# Patient Record
Sex: MALE | Race: WHITE | Employment: OTHER | URBAN - METROPOLITAN AREA
[De-identification: names, ages, dates, MRNs, and addresses within clinical notes are randomized per-mention and may not be internally consistent; named-entity substitution may affect disease eponyms.]

---

## 2017-01-09 RX ORDER — INSULIN GLARGINE 100 [IU]/ML
INJECTION, SOLUTION SUBCUTANEOUS
Qty: 15 ML | Refills: 0 | Status: SHIPPED | OUTPATIENT
Start: 2017-01-09 | End: 2017-02-05

## 2017-02-06 RX ORDER — INSULIN GLARGINE 100 [IU]/ML
INJECTION, SOLUTION SUBCUTANEOUS
Qty: 15 ML | Refills: 0 | Status: SHIPPED | OUTPATIENT
Start: 2017-02-06 | End: 2017-03-06

## 2017-03-06 DIAGNOSIS — N40.0 BENIGN PROSTATIC HYPERPLASIA, PRESENCE OF LOWER URINARY TRACT SYMPTOMS UNSPECIFIED, UNSPECIFIED MORPHOLOGY: Primary | ICD-10-CM

## 2017-03-06 DIAGNOSIS — E11.36 TYPE 2 DIABETES MELLITUS WITH DIABETIC CATARACT, UNSPECIFIED LONG TERM INSULIN USE STATUS: ICD-10-CM

## 2017-03-07 RX ORDER — INSULIN GLARGINE 100 [IU]/ML
INJECTION, SOLUTION SUBCUTANEOUS
Qty: 15 ML | Refills: 3 | Status: SHIPPED | OUTPATIENT
Start: 2017-03-07 | End: 2018-01-23

## 2017-03-07 RX ORDER — FINASTERIDE 5 MG/1
TABLET, FILM COATED ORAL
Qty: 30 TABLET | Refills: 6 | Status: SHIPPED | OUTPATIENT
Start: 2017-03-07 | End: 2017-12-21

## 2017-03-10 RX ORDER — FENOFIBRATE 145 MG/1
TABLET, COATED ORAL
Qty: 30 TABLET | Refills: 3 | Status: SHIPPED | OUTPATIENT
Start: 2017-03-10 | End: 2017-07-06

## 2017-03-21 RX ORDER — INSULIN LISPRO 100 [IU]/ML
INJECTION, SOLUTION INTRAVENOUS; SUBCUTANEOUS
Qty: 15 ML | Refills: 0 | Status: SHIPPED | OUTPATIENT
Start: 2017-03-21

## 2017-03-24 ENCOUNTER — APPOINTMENT (OUTPATIENT)
Dept: LAB | Age: 67
End: 2017-03-24
Attending: UROLOGY
Payer: MEDICARE

## 2017-03-24 DIAGNOSIS — Z12.5 PROSTATE CANCER SCREENING: ICD-10-CM

## 2017-03-24 LAB — PSA SERPL-MCNC: 1.7 NG/ML (ref 0–4)

## 2017-03-24 PROCEDURE — 36415 COLL VENOUS BLD VENIPUNCTURE: CPT

## 2017-05-19 RX ORDER — ALFUZOSIN HYDROCHLORIDE 10 MG/1
TABLET, EXTENDED RELEASE ORAL
Qty: 90 TABLET | Refills: 3 | Status: SHIPPED | OUTPATIENT
Start: 2017-05-19 | End: 2018-01-23

## 2017-05-19 NOTE — TELEPHONE ENCOUNTER
Dr. Aleksandra Bridges, pt [address] [place] [address] 12/2/16 and pt pharmacy requesting refill on alfuzosin if you agree please review and sign med, thank you! Copied and pasted part of your note below. ..      Expand All Collapse All    1.   Continue Alfuzosin 10 mg daily

## 2017-06-16 ENCOUNTER — APPOINTMENT (OUTPATIENT)
Dept: LAB | Age: 67
End: 2017-06-16
Attending: UROLOGY
Payer: MEDICARE

## 2017-06-16 DIAGNOSIS — N20.0 KIDNEY STONE: ICD-10-CM

## 2017-06-16 PROCEDURE — 84105 ASSAY OF URINE PHOSPHORUS: CPT

## 2017-06-16 PROCEDURE — 83945 ASSAY OF OXALATE: CPT

## 2017-06-16 PROCEDURE — 84560 ASSAY OF URINE/URIC ACID: CPT

## 2017-06-16 PROCEDURE — 81003 URINALYSIS AUTO W/O SCOPE: CPT

## 2017-06-16 PROCEDURE — 84300 ASSAY OF URINE SODIUM: CPT

## 2017-06-16 PROCEDURE — 82340 ASSAY OF CALCIUM IN URINE: CPT

## 2017-06-16 PROCEDURE — 82507 ASSAY OF CITRATE: CPT

## 2017-07-05 ENCOUNTER — OFFICE VISIT (OUTPATIENT)
Dept: SURGERY | Facility: CLINIC | Age: 67
End: 2017-07-05

## 2017-07-05 VITALS
SYSTOLIC BLOOD PRESSURE: 144 MMHG | WEIGHT: 212 LBS | BODY MASS INDEX: 29.68 KG/M2 | HEIGHT: 71 IN | DIASTOLIC BLOOD PRESSURE: 72 MMHG | TEMPERATURE: 98 F

## 2017-07-05 DIAGNOSIS — N20.0 KIDNEY STONE: Primary | ICD-10-CM

## 2017-07-05 DIAGNOSIS — N21.0 BLADDER STONE: ICD-10-CM

## 2017-07-05 DIAGNOSIS — R82.993 HYPERURICOSURIA: ICD-10-CM

## 2017-07-05 DIAGNOSIS — Z91.89 AT RISK FOR SIDE EFFECT OF MEDICATION: ICD-10-CM

## 2017-07-05 DIAGNOSIS — R35.1 NOCTURIA: ICD-10-CM

## 2017-07-05 DIAGNOSIS — N52.01 ERECTILE DYSFUNCTION DUE TO ARTERIAL INSUFFICIENCY: ICD-10-CM

## 2017-07-05 DIAGNOSIS — R82.994 HYPERCALCIURIA: ICD-10-CM

## 2017-07-05 DIAGNOSIS — N40.1 BENIGN NON-NODULAR PROSTATIC HYPERPLASIA WITH LOWER URINARY TRACT SYMPTOMS: ICD-10-CM

## 2017-07-05 PROCEDURE — 99215 OFFICE O/P EST HI 40 MIN: CPT | Performed by: UROLOGY

## 2017-07-05 PROCEDURE — G0463 HOSPITAL OUTPT CLINIC VISIT: HCPCS | Performed by: UROLOGY

## 2017-07-05 RX ORDER — CHLORTHALIDONE 25 MG/1
25 TABLET ORAL DAILY
Qty: 30 TABLET | Refills: 11 | Status: SHIPPED | OUTPATIENT
Start: 2017-07-05 | End: 2018-06-04

## 2017-07-05 RX ORDER — POTASSIUM CITRATE 10 MEQ/1
20 TABLET, EXTENDED RELEASE ORAL 2 TIMES DAILY
Qty: 120 TABLET | Refills: 11 | Status: SHIPPED | OUTPATIENT
Start: 2017-07-05

## 2017-07-05 NOTE — PROGRESS NOTES
HPI:    Patient ID: Vince Bruce is a 78year old male.     HPI       Voiding Dysfunction  Patient has current AUA score of 8, moderate voiding dysfunction category, worsened compared to previous score of 5, mild voiding dysfunction category, on 12/2/16 p 2004. cleared of all bladder stones, but then they returned on plain x-ray. cystoscopy with laser lithotripsy of bladder stones and fulguration of BPH bleeding at [address] [place] [address] on September 16, 2008; this was for recurrent bladder stones.  trilobar obstructing BPH wi shortness of breath. Cardiovascular: Negative for chest pain. Gastrointestinal: Negative for blood in stool, constipation and diarrhea. Genitourinary: Negative for dysuria, flank pain and hematuria (gross).         Positive sensation of not emptying DAILY Disp: 90 tablet Rfl: 3   HUMALOG KWIKPEN 100 UNIT/ML Subcutaneous Solution Pen-injector INJECT 10 UNITS SUBCUTANEOUSLY THREE TIMES DAILY Disp: 15 mL Rfl: 0   FINASTERIDE 5 MG Oral Tab TAKE 1 TABLET BY MOUTH EVERY MORNING Disp: 30 tablet Rfl: 6   ALEXEY BP: 144/72   Temp: 97.7 °F (36.5 °C)   TempSrc: Oral   Weight: 212 lb (96.2 kg)   Height: 5' 11\" (1.803 m)         Body mass index is 29.57 kg/m².     LABS:  6/16/17 Urine Citric acid 1012 = high normal range; significant hypercalciuria urine calcium 461 understanding and decides to start medication.     (N21.0) Bladder stone  Plan: He is known to have persistent bladder stones and up until now, has wanted observation 11/21/16 US bladder: Prevoid volume: 1088 ml;  ml prostatomegaly;several mobile  i Prescribed chlorthalidone 25 mg and potassium citrate 1080 mg tablet,    2 tablets twice daily with food for treatment and advised f/u in 6 months.   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

## 2017-07-05 NOTE — PATIENT INSTRUCTIONS
1.  Start chlorthalidone 25 mg daily to improve your hypercalciuria (elevated levels of calcium in your urine)    2.   Start potassium citrate 1080 mg tablet,    2 tablets twice daily with food--to prevent loss of potassium from chlorthalidone; also to furt

## 2017-07-06 RX ORDER — LISINOPRIL 10 MG/1
TABLET ORAL
COMMUNITY
Start: 2017-05-28 | End: 2018-01-08

## 2017-07-06 RX ORDER — BLOOD-GLUCOSE METER
KIT MISCELLANEOUS
Refills: 4 | COMMUNITY
Start: 2017-05-01

## 2017-07-06 RX ORDER — LANCETS 28 GAUGE
EACH MISCELLANEOUS
Refills: 4 | COMMUNITY
Start: 2017-05-02

## 2017-07-06 RX ORDER — PEN NEEDLE, DIABETIC 32GX 5/32"
NEEDLE, DISPOSABLE MISCELLANEOUS
Refills: 4 | COMMUNITY
Start: 2017-05-01

## 2017-07-06 RX ORDER — INSULIN LISPRO 100 [IU]/ML
INJECTION, SUSPENSION SUBCUTANEOUS
COMMUNITY
Start: 2017-07-03 | End: 2018-01-23

## 2017-07-06 NOTE — TELEPHONE ENCOUNTER
Patient needs a RX for cholesterol, he doesn't have an old bottle and doesn't know what dose of generic he should be on. Please call the pharmacy for him and a 90 day supply.

## 2017-07-07 RX ORDER — FENOFIBRATE 145 MG/1
145 TABLET, COATED ORAL
Qty: 30 TABLET | Refills: 3 | Status: SHIPPED | OUTPATIENT
Start: 2017-07-07 | End: 2017-10-22

## 2017-07-07 RX ORDER — ATORVASTATIN CALCIUM 80 MG/1
80 TABLET, FILM COATED ORAL NIGHTLY
Qty: 90 TABLET | Refills: 1 | Status: SHIPPED | OUTPATIENT
Start: 2017-07-07

## 2017-07-18 ENCOUNTER — MED REC SCAN ONLY (OUTPATIENT)
Dept: INTERNAL MEDICINE CLINIC | Facility: CLINIC | Age: 67
End: 2017-07-18

## 2017-10-23 RX ORDER — FENOFIBRATE 145 MG/1
TABLET, COATED ORAL
Qty: 30 TABLET | Refills: 0 | Status: SHIPPED | OUTPATIENT
Start: 2017-10-23 | End: 2017-11-18

## 2017-11-20 RX ORDER — FENOFIBRATE 145 MG/1
TABLET, COATED ORAL
Qty: 30 TABLET | Refills: 0 | Status: SHIPPED | OUTPATIENT
Start: 2017-11-20

## 2017-12-21 DIAGNOSIS — N40.0 BENIGN PROSTATIC HYPERPLASIA: ICD-10-CM

## 2017-12-21 RX ORDER — FINASTERIDE 5 MG/1
TABLET, FILM COATED ORAL
Qty: 30 TABLET | Refills: 0 | Status: SHIPPED | OUTPATIENT
Start: 2017-12-21 | End: 2018-01-16

## 2018-01-02 ENCOUNTER — HOSPITAL ENCOUNTER (OUTPATIENT)
Dept: GENERAL RADIOLOGY | Age: 68
Discharge: HOME | End: 2018-01-02
Attending: UROLOGY
Payer: MEDICARE

## 2018-01-02 ENCOUNTER — APPOINTMENT (OUTPATIENT)
Dept: LAB | Age: 68
End: 2018-01-02
Attending: UROLOGY
Payer: MEDICARE

## 2018-01-02 DIAGNOSIS — Z91.89 AT RISK FOR SIDE EFFECT OF MEDICATION: ICD-10-CM

## 2018-01-02 DIAGNOSIS — N20.0 KIDNEY STONE: ICD-10-CM

## 2018-01-02 DIAGNOSIS — N21.0 BLADDER STONE: ICD-10-CM

## 2018-01-02 LAB
ANION GAP SERPL CALC-SCNC: 10 MMOL/L (ref 0–18)
BUN SERPL-MCNC: 33 MG/DL (ref 8–20)
BUN/CREAT SERPL: 18.3 (ref 10–20)
CALCIUM SERPL-MCNC: 9.5 MG/DL (ref 8.5–10.5)
CHLORIDE SERPL-SCNC: 100 MMOL/L (ref 95–110)
CO2 SERPL-SCNC: 26 MMOL/L (ref 22–32)
CREAT SERPL-MCNC: 1.8 MG/DL (ref 0.5–1.5)
GLUCOSE SERPL-MCNC: 316 MG/DL (ref 70–99)
OSMOLALITY UR CALC.SUM OF ELEC: 301 MOSM/KG (ref 275–295)
POTASSIUM SERPL-SCNC: 4.1 MMOL/L (ref 3.3–5.1)
SODIUM SERPL-SCNC: 136 MMOL/L (ref 136–144)

## 2018-01-02 PROCEDURE — 80048 BASIC METABOLIC PNL TOTAL CA: CPT

## 2018-01-02 PROCEDURE — 36415 COLL VENOUS BLD VENIPUNCTURE: CPT

## 2018-01-02 PROCEDURE — 74019 RADEX ABDOMEN 2 VIEWS: CPT | Performed by: UROLOGY

## 2018-01-03 DIAGNOSIS — N19 RENAL FAILURE, UNSPECIFIED CHRONICITY: Primary | ICD-10-CM

## 2018-01-04 ENCOUNTER — TELEPHONE (OUTPATIENT)
Dept: SURGERY | Facility: CLINIC | Age: 68
End: 2018-01-04

## 2018-01-04 ENCOUNTER — APPOINTMENT (OUTPATIENT)
Dept: LAB | Age: 68
End: 2018-01-04
Attending: UROLOGY
Payer: MEDICARE

## 2018-01-04 DIAGNOSIS — N20.0 KIDNEY STONE: ICD-10-CM

## 2018-01-04 LAB
CALCIUM 24H UR-MRATE: 201 MG/24H (ref 50–150)
PH 24H UR: 6 [PH] (ref 5–8)
PHOSPHATE 24H UR-MRATE: 1.2 G/24 HR (ref 0.4–1.3)
SODIUM 24H UR-SRATE: 144 MMOL/24H (ref 40–220)
SPECIMEN VOL 24H UR: 1950 ML/24H
URATE 24H UR-MRATE: 891 MG/24H (ref 250–750)

## 2018-01-04 PROCEDURE — 84105 ASSAY OF URINE PHOSPHORUS: CPT

## 2018-01-04 PROCEDURE — 82507 ASSAY OF CITRATE: CPT

## 2018-01-04 PROCEDURE — 81003 URINALYSIS AUTO W/O SCOPE: CPT

## 2018-01-04 PROCEDURE — 84560 ASSAY OF URINE/URIC ACID: CPT

## 2018-01-04 PROCEDURE — 84300 ASSAY OF URINE SODIUM: CPT

## 2018-01-04 PROCEDURE — 83945 ASSAY OF OXALATE: CPT

## 2018-01-04 PROCEDURE — 82340 ASSAY OF CALCIUM IN URINE: CPT

## 2018-01-04 NOTE — TELEPHONE ENCOUNTER
----- Message from Trina Russell MD sent at 1/3/2018 10:42 PM CST -----  Nurses, please call patient with the following message ---  Blood glucose abnormally high at 316 whereas normal would be up to 99. Kidney function blood test is worse compared to a year ago. KUB plain x-ray shows a large  cluster of bladder stones, probably representing 1 composite stone and ask about 1.5-2 inches in size inside the bladder. I am asking my nurses to call you to have you get a kidney ultrasound before you see me next week to make sure there is no blockage of either kidney, a concern in light of the worsening in kidney function. , please try to get a kidney ultrasound I will discuss all these matters with patient in person when I see patient in the office next week.   Juan M Hawk M.D., FACS

## 2018-01-05 ENCOUNTER — HOSPITAL ENCOUNTER (OUTPATIENT)
Dept: ULTRASOUND IMAGING | Age: 68
Discharge: HOME | End: 2018-01-05
Attending: UROLOGY
Payer: MEDICARE

## 2018-01-05 ENCOUNTER — APPOINTMENT (OUTPATIENT)
Dept: LAB | Age: 68
End: 2018-01-05
Attending: UROLOGY
Payer: MEDICARE

## 2018-01-05 DIAGNOSIS — N19 RENAL FAILURE, UNSPECIFIED CHRONICITY: ICD-10-CM

## 2018-01-05 LAB
CITRIC ACID, URINE - MG/DAY: 526 MG/D
CITRIC ACID, URINE - MG/L: 270 MG/L
CITRIC ACID/CREATININE RATIO: 325 MG/G
CREATININE, URINE - PER 24H: 1618 MG/D
CREATININE, URINE - PER VOLUME: 83 MG/DL
HOURS COLLECTED: 24 HR
TOTAL VOLUME: 1950 ML
URATE SERPL-MCNC: 5.1 MG/DL (ref 3.3–8.7)

## 2018-01-05 PROCEDURE — 84550 ASSAY OF BLOOD/URIC ACID: CPT | Performed by: UROLOGY

## 2018-01-05 PROCEDURE — 36415 COLL VENOUS BLD VENIPUNCTURE: CPT | Performed by: UROLOGY

## 2018-01-05 PROCEDURE — 76770 US EXAM ABDO BACK WALL COMP: CPT | Performed by: UROLOGY

## 2018-01-06 LAB
CREATININE, URINE - PER 24H: 1696 MG/D
CREATININE, URINE - PER VOLUME: 87 MG/DL
HOURS COLLECTED: 24 HR
OXALATE, URINE - MG/DAY: 25 MG/D
OXALATE, URINE - MG/L: 13 MG/L
TOTAL VOLUME: 1950 ML

## 2018-01-08 ENCOUNTER — TELEPHONE (OUTPATIENT)
Dept: SURGERY | Facility: CLINIC | Age: 68
End: 2018-01-08

## 2018-01-08 ENCOUNTER — OFFICE VISIT (OUTPATIENT)
Dept: SURGERY | Facility: CLINIC | Age: 68
End: 2018-01-08

## 2018-01-08 VITALS
WEIGHT: 208 LBS | DIASTOLIC BLOOD PRESSURE: 68 MMHG | TEMPERATURE: 98 F | HEART RATE: 74 BPM | BODY MASS INDEX: 29.12 KG/M2 | HEIGHT: 71 IN | RESPIRATION RATE: 16 BRPM | SYSTOLIC BLOOD PRESSURE: 122 MMHG

## 2018-01-08 DIAGNOSIS — N21.0 BLADDER STONE: Primary | ICD-10-CM

## 2018-01-08 DIAGNOSIS — R82.994 HYPERCALCIURIA: ICD-10-CM

## 2018-01-08 DIAGNOSIS — R31.29 MICROHEMATURIA: ICD-10-CM

## 2018-01-08 DIAGNOSIS — R35.1 NOCTURIA: ICD-10-CM

## 2018-01-08 DIAGNOSIS — N20.0 KIDNEY STONE: ICD-10-CM

## 2018-01-08 DIAGNOSIS — R79.89 LOW VITAMIN D LEVEL: ICD-10-CM

## 2018-01-08 DIAGNOSIS — N40.1 BENIGN PROSTATIC HYPERPLASIA WITH URINARY FREQUENCY: ICD-10-CM

## 2018-01-08 DIAGNOSIS — N52.01 ERECTILE DYSFUNCTION DUE TO ARTERIAL INSUFFICIENCY: ICD-10-CM

## 2018-01-08 DIAGNOSIS — R35.0 BENIGN PROSTATIC HYPERPLASIA WITH URINARY FREQUENCY: ICD-10-CM

## 2018-01-08 DIAGNOSIS — R82.993 HYPERURICOSURIA: ICD-10-CM

## 2018-01-08 DIAGNOSIS — N19 RENAL FAILURE, UNSPECIFIED CHRONICITY: ICD-10-CM

## 2018-01-08 PROCEDURE — 99215 OFFICE O/P EST HI 40 MIN: CPT | Performed by: UROLOGY

## 2018-01-08 PROCEDURE — G0463 HOSPITAL OUTPT CLINIC VISIT: HCPCS | Performed by: UROLOGY

## 2018-01-08 NOTE — PATIENT INSTRUCTIONS
1.  Blood draw for vitamin D 25-hydroxy in the near future. That will help us determine if you need to be on supplemental vitamin D    2. Continue alfuzosin 10 mg daily    3. Continue finasteride 5 mg daily    4.   Continue chlorthalidone 25 mg daily may be needed right now. If symptoms are more severe, treatment is likely needed. The goal of treatment is to improve urine flow and reduce symptoms. Treatments can include medicine and procedures.  Your healthcare provider will discuss treatment options wi urine  · Increasing low back pain, not related to injury  · Symptoms of urinary infection (increased urge to urinate, burning when passing urine, foul-smelling urine)  Date Last Reviewed: 7/1/2016  © 0043-2682 [employer].  [facility]

## 2018-01-08 NOTE — TELEPHONE ENCOUNTER
Patient seen in office scheduled for cysto, laser litho large bladder stone, on 02/20/18 @ 7:30 at [facility]/outpatient, went over pre-op with patient stated verbally understood.

## 2018-01-08 NOTE — PROGRESS NOTES
HPI:    Patient ID: Yifan Joaquin is a 75year old male. HPI     Bladder stone  Chronic problem. 01/02/2018 KUB = Innumerable (more than 20) bladder calculi visualized within the bladder measure up to 14 mm in diameter similar to prior abdominal Ct.  Pa Chronic problem. Patient denies any gross hematuria. He feels problem is stable. Kidney Failure  01/02/2018 Creatinine = 1.80; eGFR = 38 compared to 2/23/16 Creatinine 1.363, eGFR 52. Pt denies flank pain. CHART REVIEW: Please see above. In addition to above. .. Known large BPH for a long time. Cystoscopy with EHL of large 4 cm bladder stone [facility] August 19, 2004. cleared of all bladder stones, but then they returned on plain x-ray.  cystoscopy with laser lithot Restarted alfuzosin 10 mg daily.  07/05/2017 office visit with me; Prostate: 3+ enlarged, 40 g, no palpable nodules or indurations; Start chlorthalidone 25 mg daily; Start potassium citrate 1080 mg tablet, 2 tablets twice daily with food; Continue finasteri Cans of beer: 1 per week     Comment: social              Current Outpatient Prescriptions:  FINASTERIDE 5 MG Oral Tab TAKE 1 TABLET BY MOUTH EVERY MORNING Disp: 30 tablet Rfl: 0   FENOFIBRATE 145 MG Oral Tab TAKE 1 TABLET(145 MG) BY MOUTH EVERY DAY Dis Neck: Normal range of motion. Cardiovascular: Normal rate, regular rhythm and normal heart sounds. Exam reveals no gallop and no friction rub. No murmur heard. Pulmonary/Chest: Effort normal and breath sounds normal. No respiratory distress.  He has 11/21/16 US bladder: Prevoid volume: 1088 ml;  ml. Conclusion Prostatomegaly. Several mobile  intraluminal bladder calculi measure up to 11 mm.    Patient states that he did not urinate as well and as normally as typically, when he had his bladder ul 01/02/2018 KUB = Innumerable (more than 20) bladder calculi visualized within the bladder measure up to 14 mm in diameter similar to prior abdominal CT.  During today's examination, I reviewed 01/02/2018 KUB with the patient and explained that most likely t 36/94/9824 metabolic 35-TL urine uric acid urine = 891, elevated. I explained to the patient this is likely caused by high intake of red meat.  I explained treatment option of limiting red meat consumption and pt chooses to follow diet protocols.    (R35.1) 9.  Please no aspirin or NSAIDs (medications such as ibuprofen/Motrin, Advil, Aleve) for 10 days before procedure    10. You may take Tylenol or extra strength Tylenol or generic equivalent even right before the procedure    11.    Urine culture at [address] [place] [address]

## 2018-01-16 DIAGNOSIS — N40.0 BENIGN PROSTATIC HYPERPLASIA: ICD-10-CM

## 2018-01-17 RX ORDER — FINASTERIDE 5 MG/1
TABLET, FILM COATED ORAL
Qty: 30 TABLET | Refills: 0 | Status: SHIPPED | OUTPATIENT
Start: 2018-01-17 | End: 2018-01-23

## 2018-01-22 ENCOUNTER — TELEPHONE (OUTPATIENT)
Dept: INTERNAL MEDICINE CLINIC | Facility: CLINIC | Age: 68
End: 2018-01-22

## 2018-01-23 RX ORDER — HYDROCODONE BITARTRATE AND ACETAMINOPHEN 5; 325 MG/1; MG/1
1 TABLET ORAL EVERY 6 HOURS PRN
COMMUNITY
End: 2018-04-26

## 2018-01-24 ENCOUNTER — TELEPHONE (OUTPATIENT)
Dept: SURGERY | Facility: CLINIC | Age: 68
End: 2018-01-24

## 2018-01-24 NOTE — TELEPHONE ENCOUNTER
Please call patient back. He needs to take his alfuzosin; if need be, he can take it with a sip of water; he should not take his chlorthalidone potassium citrate on the day of his orthopedic surgery, however.   Many thanks, Dr. Roberta Rascon

## 2018-01-24 NOTE — H&P
[facility]    PATIENT'S NAME: NATION, [de-identified] D   ATTENDING PHYSICIAN: Ryann Beckett MD   PATIENT ACCOUNT#:   [id number]    LOCATION:  [facility]  MEDICAL RECORD #:   [id number]       YOB: 1950  ADMISSION DATE:       01/25/2018 fibula pain. Skin wrinkles were intact. There were no blisters. X-rays show an unstable right ankle fracture with a fibula fracture and widening of the medial clear space indicative of medial deltoid ligament rupture.   There was also a small posterior

## 2018-01-24 NOTE — TELEPHONE ENCOUNTER
Pt tyler his lag and has to have surgery tomorrow - asking about going off meds for this and if it will affect his surgery with PVK on 2/20

## 2018-01-24 NOTE — TELEPHONE ENCOUNTER
Returned pt's call and spoke with him. He is asking if he has to stop any of his medications prior to surgery on his leg tomorrow. Advised pt to call the nurse of the surgeon who is performing the leg surgery, as this is the surgery protocol , not urology.

## 2018-01-25 ENCOUNTER — ANESTHESIA EVENT (OUTPATIENT)
Dept: SURGERY | Facility: HOSPITAL | Age: 68
End: 2018-01-25
Payer: MEDICARE

## 2018-01-25 ENCOUNTER — HOSPITAL ENCOUNTER (OUTPATIENT)
Facility: HOSPITAL | Age: 68
Setting detail: HOSPITAL OUTPATIENT SURGERY
Discharge: HOME | End: 2018-01-25
Attending: ORTHOPAEDIC SURGERY | Admitting: ORTHOPAEDIC SURGERY
Payer: MEDICARE

## 2018-01-25 ENCOUNTER — APPOINTMENT (OUTPATIENT)
Dept: GENERAL RADIOLOGY | Facility: HOSPITAL | Age: 68
End: 2018-01-25
Attending: ORTHOPAEDIC SURGERY
Payer: MEDICARE

## 2018-01-25 ENCOUNTER — ANESTHESIA (OUTPATIENT)
Dept: SURGERY | Facility: HOSPITAL | Age: 68
End: 2018-01-25
Payer: MEDICARE

## 2018-01-25 ENCOUNTER — SURGERY (OUTPATIENT)
Age: 68
End: 2018-01-25

## 2018-01-25 VITALS
DIASTOLIC BLOOD PRESSURE: 70 MMHG | SYSTOLIC BLOOD PRESSURE: 138 MMHG | OXYGEN SATURATION: 98 % | TEMPERATURE: 99 F | HEART RATE: 80 BPM | BODY MASS INDEX: 28.7 KG/M2 | RESPIRATION RATE: 15 BRPM | WEIGHT: 205 LBS | HEIGHT: 71 IN

## 2018-01-25 DIAGNOSIS — S82.851A CLOSED DISPLACED TRIMALLEOLAR FRACTURE OF RIGHT ANKLE, INITIAL ENCOUNTER: Primary | ICD-10-CM

## 2018-01-25 PROBLEM — I10 ESSENTIAL HYPERTENSION: Chronic | Status: ACTIVE | Noted: 2018-01-25
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
## 2018-01-25 PROBLEM — E78.5 HYPERLIPIDEMIA: Chronic | Status: ACTIVE | Noted: 2018-01-25
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
## 2018-01-25 LAB
GLUCOSE BLDC GLUCOMTR-MCNC: 137 MG/DL (ref 70–99)
GLUCOSE BLDC GLUCOMTR-MCNC: 178 MG/DL (ref 70–99)
GLUCOSE BLDC GLUCOMTR-MCNC: 216 MG/DL (ref 70–99)

## 2018-01-25 PROCEDURE — 64445 NJX AA&/STRD SCIATIC NRV IMG: CPT | Performed by: ORTHOPAEDIC SURGERY

## 2018-01-25 PROCEDURE — 3E0T3BZ INTRODUCTION OF ANESTHETIC AGENT INTO PERIPHERAL NERVES AND PLEXI, PERCUTANEOUS APPROACH: ICD-10-PCS | Performed by: ANESTHESIOLOGY

## 2018-01-25 PROCEDURE — 73610 X-RAY EXAM OF ANKLE: CPT | Performed by: ORTHOPAEDIC SURGERY

## 2018-01-25 PROCEDURE — 82962 GLUCOSE BLOOD TEST: CPT

## 2018-01-25 PROCEDURE — 99152 MOD SED SAME PHYS/QHP 5/>YRS: CPT | Performed by: ORTHOPAEDIC SURGERY

## 2018-01-25 PROCEDURE — 76942 ECHO GUIDE FOR BIOPSY: CPT | Performed by: ORTHOPAEDIC SURGERY

## 2018-01-25 PROCEDURE — 0QSJ04Z REPOSITION RIGHT FIBULA WITH INTERNAL FIXATION DEVICE, OPEN APPROACH: ICD-10-PCS | Performed by: ORTHOPAEDIC SURGERY

## 2018-01-25 PROCEDURE — 0QSG04Z REPOSITION RIGHT TIBIA WITH INTERNAL FIXATION DEVICE, OPEN APPROACH: ICD-10-PCS | Performed by: ORTHOPAEDIC SURGERY

## 2018-01-25 PROCEDURE — 76001 XR C-ARM FLUORO >1 HOUR  (CPT=76001): CPT | Performed by: ORTHOPAEDIC SURGERY

## 2018-01-25 DEVICE — SCREW BN 2.7MM 20MM LCP SS: Type: IMPLANTABLE DEVICE | Site: ANKLE | Status: FUNCTIONAL

## 2018-01-25 DEVICE — IMPLANTABLE DEVICE: Type: IMPLANTABLE DEVICE | Site: ANKLE | Status: FUNCTIONAL

## 2018-01-25 DEVICE — SCREW BN 2.7MM 2.1MM 16MM LCP: Type: IMPLANTABLE DEVICE | Site: ANKLE | Status: FUNCTIONAL

## 2018-01-25 DEVICE — SCREW BN 2.7MM 18MM LCP SS: Type: IMPLANTABLE DEVICE | Site: ANKLE | Status: FUNCTIONAL

## 2018-01-25 RX ORDER — IBUPROFEN 600 MG/1
600 TABLET ORAL EVERY 6 HOURS PRN
Status: CANCELLED | OUTPATIENT
Start: 2018-01-25

## 2018-01-25 RX ORDER — METOCLOPRAMIDE 10 MG/1
10 TABLET ORAL ONCE
Status: DISCONTINUED | OUTPATIENT
Start: 2018-01-25 | End: 2018-01-25 | Stop reason: HOSPADM

## 2018-01-25 RX ORDER — HYDROCODONE BITARTRATE AND ACETAMINOPHEN 5; 325 MG/1; MG/1
1 TABLET ORAL AS NEEDED
Status: DISCONTINUED | OUTPATIENT
Start: 2018-01-25 | End: 2018-01-25

## 2018-01-25 RX ORDER — MULTIVIT-MIN/FA/LYCOPEN/LUTEIN .4-300-25
1 TABLET ORAL DAILY
Status: CANCELLED | OUTPATIENT
Start: 2018-01-25

## 2018-01-25 RX ORDER — BLOOD-GLUCOSE METER
1 KIT MISCELLANEOUS 3 TIMES DAILY
Status: CANCELLED | OUTPATIENT
Start: 2018-01-25

## 2018-01-25 RX ORDER — FINASTERIDE 5 MG/1
5 TABLET, FILM COATED ORAL DAILY
Status: CANCELLED | OUTPATIENT
Start: 2018-01-25

## 2018-01-25 RX ORDER — CYANOCOBALAMIN (VITAMIN B-12) 1000 MCG
1 TABLET, EXTENDED RELEASE ORAL 2 TIMES DAILY WITH MEALS
Status: CANCELLED | OUTPATIENT
Start: 2018-01-25

## 2018-01-25 RX ORDER — DEXTROSE MONOHYDRATE 25 G/50ML
50 INJECTION, SOLUTION INTRAVENOUS AS NEEDED
Status: CANCELLED | OUTPATIENT
Start: 2018-01-25

## 2018-01-25 RX ORDER — HYDROMORPHONE HYDROCHLORIDE 1 MG/ML
0.2 INJECTION, SOLUTION INTRAMUSCULAR; INTRAVENOUS; SUBCUTANEOUS EVERY 5 MIN PRN
Status: DISCONTINUED | OUTPATIENT
Start: 2018-01-25 | End: 2018-01-25

## 2018-01-25 RX ORDER — HALOPERIDOL 5 MG/ML
0.25 INJECTION INTRAMUSCULAR ONCE AS NEEDED
Status: DISCONTINUED | OUTPATIENT
Start: 2018-01-25 | End: 2018-01-25

## 2018-01-25 RX ORDER — HYDROCODONE BITARTRATE AND ACETAMINOPHEN 5; 325 MG/1; MG/1
2 TABLET ORAL AS NEEDED
Status: DISCONTINUED | OUTPATIENT
Start: 2018-01-25 | End: 2018-01-25

## 2018-01-25 RX ORDER — CEFAZOLIN SODIUM/WATER 2 G/20 ML
2 SYRINGE (ML) INTRAVENOUS ONCE
Status: DISCONTINUED | OUTPATIENT
Start: 2018-01-25 | End: 2018-01-25 | Stop reason: HOSPADM

## 2018-01-25 RX ORDER — CHLORTHALIDONE 25 MG/1
25 TABLET ORAL DAILY
Status: CANCELLED | OUTPATIENT
Start: 2018-01-25

## 2018-01-25 RX ORDER — HYDROMORPHONE HYDROCHLORIDE 1 MG/ML
0.4 INJECTION, SOLUTION INTRAMUSCULAR; INTRAVENOUS; SUBCUTANEOUS EVERY 5 MIN PRN
Status: DISCONTINUED | OUTPATIENT
Start: 2018-01-25 | End: 2018-01-25

## 2018-01-25 RX ORDER — ACETAMINOPHEN 500 MG
1000 TABLET ORAL ONCE
Status: DISCONTINUED | OUTPATIENT
Start: 2018-01-25 | End: 2018-01-25 | Stop reason: HOSPADM

## 2018-01-25 RX ORDER — MORPHINE SULFATE 10 MG/ML
6 INJECTION, SOLUTION INTRAMUSCULAR; INTRAVENOUS EVERY 10 MIN PRN
Status: DISCONTINUED | OUTPATIENT
Start: 2018-01-25 | End: 2018-01-25

## 2018-01-25 RX ORDER — IBUPROFEN 600 MG/1
600 TABLET ORAL EVERY 6 HOURS PRN
Status: DISCONTINUED | OUTPATIENT
Start: 2018-01-25 | End: 2018-01-25

## 2018-01-25 RX ORDER — POTASSIUM CITRATE 10 MEQ/1
20 TABLET, EXTENDED RELEASE ORAL 2 TIMES DAILY
Status: CANCELLED | OUTPATIENT
Start: 2018-01-25

## 2018-01-25 RX ORDER — MORPHINE SULFATE 4 MG/ML
4 INJECTION, SOLUTION INTRAMUSCULAR; INTRAVENOUS EVERY 10 MIN PRN
Status: DISCONTINUED | OUTPATIENT
Start: 2018-01-25 | End: 2018-01-25

## 2018-01-25 RX ORDER — HYDROMORPHONE HYDROCHLORIDE 1 MG/ML
0.6 INJECTION, SOLUTION INTRAMUSCULAR; INTRAVENOUS; SUBCUTANEOUS EVERY 5 MIN PRN
Status: DISCONTINUED | OUTPATIENT
Start: 2018-01-25 | End: 2018-01-25

## 2018-01-25 RX ORDER — FAMOTIDINE 20 MG/1
20 TABLET ORAL ONCE
Status: DISCONTINUED | OUTPATIENT
Start: 2018-01-25 | End: 2018-01-25 | Stop reason: HOSPADM

## 2018-01-25 RX ORDER — ROPIVACAINE HYDROCHLORIDE 5 MG/ML
INJECTION, SOLUTION EPIDURAL; INFILTRATION; PERINEURAL AS NEEDED
Status: DISCONTINUED | OUTPATIENT
Start: 2018-01-25 | End: 2018-01-25 | Stop reason: SURG

## 2018-01-25 RX ORDER — ONDANSETRON 2 MG/ML
4 INJECTION INTRAMUSCULAR; INTRAVENOUS EVERY 6 HOURS PRN
Status: DISCONTINUED | OUTPATIENT
Start: 2018-01-25 | End: 2018-01-25

## 2018-01-25 RX ORDER — MULTIVIT-MIN/FA/LYCOPEN/LUTEIN .4-300-25
1 TABLET ORAL DAILY
Qty: 30 TABLET | Refills: 0 | Status: SHIPPED | OUTPATIENT
Start: 2018-01-25

## 2018-01-25 RX ORDER — SODIUM CHLORIDE 9 MG/ML
INJECTION, SOLUTION INTRAVENOUS CONTINUOUS PRN
Status: DISCONTINUED | OUTPATIENT
Start: 2018-01-25 | End: 2018-01-25 | Stop reason: SURG

## 2018-01-25 RX ORDER — SODIUM CHLORIDE, SODIUM LACTATE, POTASSIUM CHLORIDE, CALCIUM CHLORIDE 600; 310; 30; 20 MG/100ML; MG/100ML; MG/100ML; MG/100ML
INJECTION, SOLUTION INTRAVENOUS CONTINUOUS
Status: DISCONTINUED | OUTPATIENT
Start: 2018-01-25 | End: 2018-01-25

## 2018-01-25 RX ORDER — ONDANSETRON 2 MG/ML
INJECTION INTRAMUSCULAR; INTRAVENOUS AS NEEDED
Status: DISCONTINUED | OUTPATIENT
Start: 2018-01-25 | End: 2018-01-25

## 2018-01-25 RX ORDER — CYANOCOBALAMIN (VITAMIN B-12) 1000 MCG
1 TABLET, EXTENDED RELEASE ORAL 2 TIMES DAILY WITH MEALS
Qty: 60 TABLET | Refills: 0 | Status: ON HOLD | OUTPATIENT
Start: 2018-01-25 | End: 2018-05-15 | Stop reason: CLARIF

## 2018-01-25 RX ORDER — MIDAZOLAM HYDROCHLORIDE 1 MG/ML
INJECTION INTRAMUSCULAR; INTRAVENOUS AS NEEDED
Status: DISCONTINUED | OUTPATIENT
Start: 2018-01-25 | End: 2018-01-25 | Stop reason: SURG

## 2018-01-25 RX ORDER — ROCURONIUM BROMIDE 10 MG/ML
INJECTION, SOLUTION INTRAVENOUS AS NEEDED
Status: DISCONTINUED | OUTPATIENT
Start: 2018-01-25 | End: 2018-01-25 | Stop reason: SURG

## 2018-01-25 RX ORDER — MORPHINE SULFATE 2 MG/ML
2 INJECTION, SOLUTION INTRAMUSCULAR; INTRAVENOUS EVERY 10 MIN PRN
Status: DISCONTINUED | OUTPATIENT
Start: 2018-01-25 | End: 2018-01-25

## 2018-01-25 RX ORDER — ONDANSETRON 2 MG/ML
4 INJECTION INTRAMUSCULAR; INTRAVENOUS ONCE AS NEEDED
Status: DISCONTINUED | OUTPATIENT
Start: 2018-01-25 | End: 2018-01-25

## 2018-01-25 RX ORDER — FENOFIBRATE 134 MG/1
134 CAPSULE ORAL
Status: CANCELLED | OUTPATIENT
Start: 2018-01-26

## 2018-01-25 RX ORDER — DOXEPIN HYDROCHLORIDE 50 MG/1
1 CAPSULE ORAL DAILY
Status: DISCONTINUED | OUTPATIENT
Start: 2018-01-25 | End: 2018-01-25

## 2018-01-25 RX ORDER — CYANOCOBALAMIN (VITAMIN B-12) 1000 MCG
1 TABLET, EXTENDED RELEASE ORAL 2 TIMES DAILY WITH MEALS
Status: DISCONTINUED | OUTPATIENT
Start: 2018-01-25 | End: 2018-01-25

## 2018-01-25 RX ORDER — DEXTROSE MONOHYDRATE 25 G/50ML
50 INJECTION, SOLUTION INTRAVENOUS
Status: DISCONTINUED | OUTPATIENT
Start: 2018-01-25 | End: 2018-01-25

## 2018-01-25 RX ORDER — LIDOCAINE HYDROCHLORIDE 10 MG/ML
INJECTION, SOLUTION EPIDURAL; INFILTRATION; INTRACAUDAL; PERINEURAL AS NEEDED
Status: DISCONTINUED | OUTPATIENT
Start: 2018-01-25 | End: 2018-01-25 | Stop reason: SURG

## 2018-01-25 RX ORDER — LIDOCAINE HYDROCHLORIDE 40 MG/ML
SOLUTION TOPICAL AS NEEDED
Status: DISCONTINUED | OUTPATIENT
Start: 2018-01-25 | End: 2018-01-25

## 2018-01-25 RX ORDER — ALFUZOSIN HYDROCHLORIDE 10 MG/1
10 TABLET, EXTENDED RELEASE ORAL DAILY
Status: CANCELLED | OUTPATIENT
Start: 2018-01-25

## 2018-01-25 RX ORDER — NALOXONE HYDROCHLORIDE 0.4 MG/ML
80 INJECTION, SOLUTION INTRAMUSCULAR; INTRAVENOUS; SUBCUTANEOUS AS NEEDED
Status: DISCONTINUED | OUTPATIENT
Start: 2018-01-25 | End: 2018-01-25

## 2018-01-25 RX ORDER — CEFAZOLIN SODIUM/WATER 2 G/20 ML
SYRINGE (ML) INTRAVENOUS AS NEEDED
Status: DISCONTINUED | OUTPATIENT
Start: 2018-01-25 | End: 2018-01-25

## 2018-01-25 RX ORDER — ATORVASTATIN CALCIUM 40 MG/1
80 TABLET, FILM COATED ORAL NIGHTLY
Status: CANCELLED | OUTPATIENT
Start: 2018-01-25

## 2018-01-25 RX ORDER — HYDROCODONE BITARTRATE AND ACETAMINOPHEN 5; 325 MG/1; MG/1
1 TABLET ORAL EVERY 6 HOURS PRN
Status: CANCELLED | OUTPATIENT
Start: 2018-01-25

## 2018-01-25 RX ORDER — IBUPROFEN 600 MG/1
600 TABLET ORAL EVERY 6 HOURS PRN
Qty: 40 TABLET | Refills: 0 | Status: SHIPPED | OUTPATIENT
Start: 2018-01-25 | End: 2018-05-08

## 2018-01-25 RX ADMIN — SODIUM CHLORIDE: 9 INJECTION, SOLUTION INTRAVENOUS at 15:17:00

## 2018-01-25 RX ADMIN — LIDOCAINE HYDROCHLORIDE 4 ML: 40 SOLUTION TOPICAL at 13:51:00

## 2018-01-25 RX ADMIN — LIDOCAINE HYDROCHLORIDE 5 ML: 10 INJECTION, SOLUTION EPIDURAL; INFILTRATION; INTRACAUDAL; PERINEURAL at 13:51:00

## 2018-01-25 RX ADMIN — ROPIVACAINE HYDROCHLORIDE 30 ML: 5 INJECTION, SOLUTION EPIDURAL; INFILTRATION; PERINEURAL at 13:02:00

## 2018-01-25 RX ADMIN — ROCURONIUM BROMIDE 10 MG: 10 INJECTION, SOLUTION INTRAVENOUS at 13:51:00

## 2018-01-25 RX ADMIN — SODIUM CHLORIDE: 9 INJECTION, SOLUTION INTRAVENOUS at 13:46:00

## 2018-01-25 RX ADMIN — CEFAZOLIN SODIUM/WATER 2 G: 2 G/20 ML SYRINGE (ML) INTRAVENOUS at 14:11:00

## 2018-01-25 RX ADMIN — MIDAZOLAM HYDROCHLORIDE 2 MG: 1 INJECTION INTRAMUSCULAR; INTRAVENOUS at 12:57:00

## 2018-01-25 RX ADMIN — ONDANSETRON 4 MG: 2 INJECTION INTRAMUSCULAR; INTRAVENOUS at 13:51:00

## 2018-01-25 RX ADMIN — LIDOCAINE HYDROCHLORIDE 2 ML: 10 INJECTION, SOLUTION EPIDURAL; INFILTRATION; INTRACAUDAL; PERINEURAL at 12:58:00

## 2018-01-25 NOTE — INTERVAL H&P NOTE
Pre-op Diagnosis: right ankle fracture, fall at home    The above referenced H&P was reviewed by Donaldo Ramírez MD on 1/25/2018, the patient was examined and no significant changes have occurred in the patient's condition since the H&P was performed.   I

## 2018-01-25 NOTE — ANESTHESIA POSTPROCEDURE EVALUATION
Patient: Lan Wild    Procedure Summary     Date:  01/25/18 Room / Location:  [address] [place] [address] MAIN OR 06 / [address] [place] [address] MAIN OR    Anesthesia Start:  5866 Anesthesia Stop:  1800    Procedure:  ANKLE OPEN REDUCTION INTERNAL FIXATION (Right Ankle) Diagnosis:  (right ankle f

## 2018-01-25 NOTE — OPERATIVE REPORT
[facility]    PATIENT'S NAME: NATION, [de-identified] D   ATTENDING PHYSICIAN: Mi Barahona MD   OPERATING PHYSICIAN: Teri Barahona MD   PATIENT ACCOUNT#:   [id number]    LOCATION:  [facility] [address] PACU [facility] 10  MEDICAL RECORD #:   [id number]       DATE draped in sterile fashion with alcohol followed by ChloraPrep. The leg was exsanguinated, and the tourniquet inflated to 300 mmHg. Tourniquet time for the case was 34 minutes. Incision was made over the distal fibula, and the fracture was identified. him that he needed to take the Citracal Plus D twice a day as well as multivitamin once a day. Ibuprofen was also prescribed. He will follow up with Dr. Tres Aldridge in 1 week's time in the office. Dictated By Shant Carmen.  Tres Aldridge MD  d: 01/25/2018 15:08:25

## 2018-01-25 NOTE — ANESTHESIA PROCEDURE NOTES
Peripheral Block    Anesthesiologist:  Amarilis Cavazos  Performed by:   Anesthesiologist  Patient Location:  PACU  Start Time:  1/25/2018 12:57 PM  End Time:  1/25/2018 1:04 PM  Site Identification: ultrasound guided, real time ultrasound guided, nerve stim

## 2018-01-25 NOTE — BRIEF OP NOTE
Pre-Operative Diagnosis: right ankle fracture, fall at home     Post-Operative Diagnosis: Right ankle trimalleolar ankle fracture, fall at home     Procedure Performed: open reduction internal fixation right ankle w/o posterior lip, fluoroscopy    Surge

## 2018-01-25 NOTE — ANESTHESIA PREPROCEDURE EVALUATION
Anesthesia PreOp Note    HPI:     Jada Segundo is a 75year old male who presents for preoperative consultation requested by: Daisy Valdez MD    Date of Surgery: 1/25/2018    Procedure(s):  ANKLE OPEN REDUCTION INTERNAL FIXATION  Indication: right a Turmeric 450 MG Oral Cap Take 900 mg by mouth daily.  Disp:  Rfl:  Past Week at Unknown time   FENOFIBRATE 145 MG Oral Tab TAKE 1 TABLET(145 MG) BY MOUTH EVERY DAY Disp: 30 tablet Rfl: 0 1/24/2018 at 0700   atorvastatin 80 MG Oral Tab Take 1 tablet (80 mg t Metoclopramide HCl (REGLAN) tab 10 mg 10 mg Oral Once Johny Bailon MD   ceFAZolin sodium (ANCEF/KEFZOL) 2 GM/20ML premix IV syringe 2 g 2 g Intravenous Once Johny Bailon MD     No current [facility]-ordered outpatient prescriptions on file.     No Known Anesthesia ROS/Med Hx and Physical Exam     Patient summary reviewed and Nursing notes reviewed    Airway   Mallampati: II  TM distance: >3 FB  Neck ROM: full  Dental          Pulmonary - negative ROS and normal exam    breath sounds clear to auscultation

## 2018-02-05 ENCOUNTER — TELEPHONE (OUTPATIENT)
Dept: SURGERY | Facility: CLINIC | Age: 68
End: 2018-02-05

## 2018-02-07 ENCOUNTER — TELEPHONE (OUTPATIENT)
Dept: SURGERY | Facility: CLINIC | Age: 68
End: 2018-02-07

## 2018-02-07 NOTE — TELEPHONE ENCOUNTER
Spoke with patient confirmed to cancel procedure 02/20 at the hospital. L/m informing surgery to cancel, patient will call back to reschedule thanks, trixie

## 2018-04-23 ENCOUNTER — TELEPHONE (OUTPATIENT)
Dept: SURGERY | Facility: CLINIC | Age: 68
End: 2018-04-23

## 2018-04-23 DIAGNOSIS — N21.0 BLADDER STONE: Primary | ICD-10-CM

## 2018-04-25 NOTE — TELEPHONE ENCOUNTER
Please if patient rtn call, please transfer to [phone] or [phone], appt. , needs to be  verbally confirmed.  thanks

## 2018-04-26 ENCOUNTER — OFFICE VISIT (OUTPATIENT)
Dept: SURGERY | Facility: CLINIC | Age: 68
End: 2018-04-26

## 2018-04-26 VITALS
WEIGHT: 212 LBS | HEIGHT: 71 IN | SYSTOLIC BLOOD PRESSURE: 138 MMHG | RESPIRATION RATE: 16 BRPM | BODY MASS INDEX: 29.68 KG/M2 | HEART RATE: 85 BPM | DIASTOLIC BLOOD PRESSURE: 80 MMHG | TEMPERATURE: 98 F

## 2018-04-26 DIAGNOSIS — N19 RENAL FAILURE, UNSPECIFIED CHRONICITY: ICD-10-CM

## 2018-04-26 DIAGNOSIS — N52.01 ERECTILE DYSFUNCTION DUE TO ARTERIAL INSUFFICIENCY: ICD-10-CM

## 2018-04-26 DIAGNOSIS — R82.993 HYPERURICOSURIA: ICD-10-CM

## 2018-04-26 DIAGNOSIS — R35.0 BENIGN PROSTATIC HYPERPLASIA WITH URINARY FREQUENCY: ICD-10-CM

## 2018-04-26 DIAGNOSIS — N40.1 BENIGN PROSTATIC HYPERPLASIA WITH URINARY FREQUENCY: ICD-10-CM

## 2018-04-26 DIAGNOSIS — N20.0 KIDNEY STONE: ICD-10-CM

## 2018-04-26 DIAGNOSIS — N21.0 BLADDER STONE: Primary | ICD-10-CM

## 2018-04-26 DIAGNOSIS — R82.994 HYPERCALCIURIA: ICD-10-CM

## 2018-04-26 DIAGNOSIS — R35.1 NOCTURIA: ICD-10-CM

## 2018-04-26 DIAGNOSIS — R79.89 LOW VITAMIN D LEVEL: ICD-10-CM

## 2018-04-26 DIAGNOSIS — R31.29 MICROHEMATURIA: ICD-10-CM

## 2018-04-26 PROCEDURE — 99214 OFFICE O/P EST MOD 30 MIN: CPT | Performed by: UROLOGY

## 2018-04-26 PROCEDURE — G0463 HOSPITAL OUTPT CLINIC VISIT: HCPCS | Performed by: UROLOGY

## 2018-04-26 NOTE — PATIENT INSTRUCTIONS
1.  Continue alfuzosin 10 mg daily     2. Continue finasteride 5 mg daily     3. Continue chlorthalidone 25 mg daily     4. Continue potassium citrate 1080 mg tablet,   2 tablets twice daily with food     5.   Continue to limit consumption of salty food

## 2018-04-26 NOTE — TELEPHONE ENCOUNTER
Patient seen in office, scheduled for cysto, laser lithotripsy, Tuesday 05/15/18 @ 7:30, at [facility]/outpatient went over pre-op with patient verbalized understanding.

## 2018-04-28 ENCOUNTER — TELEPHONE (OUTPATIENT)
Dept: SURGERY | Facility: CLINIC | Age: 68
End: 2018-04-28

## 2018-04-29 NOTE — TELEPHONE ENCOUNTER
Nurses, please ask [facility] at the  to correct the name of the  primary physician listed in epic =   it should be Dr. Stefano Lawton MD instead of Dr. Priscilla Stephen.     Many thanks, Dr. Gagan Callahan

## 2018-05-10 RX ORDER — ALFUZOSIN HYDROCHLORIDE 10 MG/1
TABLET, EXTENDED RELEASE ORAL
Qty: 90 TABLET | Refills: 3 | Status: ON HOLD | OUTPATIENT
Start: 2018-05-10 | End: 2018-05-15

## 2018-05-10 NOTE — TELEPHONE ENCOUNTER
Dr. Ness Mo, pt [address] [facility] 4/26/18 pt pharmacy requesting refill on alfuzosin if you agree please review and sign med, I copied and pasted part of your last note below,       1.  Blood draw for vitamin D 25-hydroxy in the near future.   That will help us determi

## 2018-05-14 RX ORDER — SODIUM CHLORIDE, SODIUM LACTATE, POTASSIUM CHLORIDE, CALCIUM CHLORIDE 600; 310; 30; 20 MG/100ML; MG/100ML; MG/100ML; MG/100ML
INJECTION, SOLUTION INTRAVENOUS CONTINUOUS
Status: DISCONTINUED | OUTPATIENT
Start: 2018-05-14 | End: 2018-05-14

## 2018-05-15 ENCOUNTER — TELEPHONE (OUTPATIENT)
Dept: SURGERY | Facility: CLINIC | Age: 68
End: 2018-05-15

## 2018-05-15 ENCOUNTER — SURGERY (OUTPATIENT)
Age: 68
End: 2018-05-15

## 2018-05-15 ENCOUNTER — HOSPITAL ENCOUNTER (OUTPATIENT)
Facility: HOSPITAL | Age: 68
Setting detail: HOSPITAL OUTPATIENT SURGERY
Discharge: HOME | End: 2018-05-15
Attending: UROLOGY | Admitting: UROLOGY
Payer: MEDICARE

## 2018-05-15 ENCOUNTER — ANESTHESIA (OUTPATIENT)
Dept: SURGERY | Facility: HOSPITAL | Age: 68
End: 2018-05-15
Payer: MEDICARE

## 2018-05-15 ENCOUNTER — ANESTHESIA EVENT (OUTPATIENT)
Dept: SURGERY | Facility: HOSPITAL | Age: 68
End: 2018-05-15
Payer: MEDICARE

## 2018-05-15 VITALS
HEART RATE: 79 BPM | SYSTOLIC BLOOD PRESSURE: 117 MMHG | DIASTOLIC BLOOD PRESSURE: 80 MMHG | TEMPERATURE: 98 F | WEIGHT: 214 LBS | HEIGHT: 71 IN | RESPIRATION RATE: 12 BRPM | OXYGEN SATURATION: 95 % | BODY MASS INDEX: 29.96 KG/M2

## 2018-05-15 DIAGNOSIS — N21.0 BLADDER STONE: ICD-10-CM

## 2018-05-15 PROCEDURE — 52318 REMOVE BLADDER STONE: CPT | Performed by: UROLOGY

## 2018-05-15 PROCEDURE — 0TCB8ZZ EXTIRPATION OF MATTER FROM BLADDER, VIA NATURAL OR ARTIFICIAL OPENING ENDOSCOPIC: ICD-10-PCS | Performed by: UROLOGY

## 2018-05-15 RX ORDER — PHENAZOPYRIDINE HYDROCHLORIDE 200 MG/1
200 TABLET, FILM COATED ORAL ONCE AS NEEDED
Status: DISCONTINUED | OUTPATIENT
Start: 2018-05-15 | End: 2018-05-15

## 2018-05-15 RX ORDER — LIDOCAINE HYDROCHLORIDE 10 MG/ML
INJECTION, SOLUTION EPIDURAL; INFILTRATION; INTRACAUDAL; PERINEURAL AS NEEDED
Status: DISCONTINUED | OUTPATIENT
Start: 2018-05-15 | End: 2018-05-15 | Stop reason: SURG

## 2018-05-15 RX ORDER — SODIUM CHLORIDE 9 MG/ML
INJECTION, SOLUTION INTRAVENOUS ONCE
Status: COMPLETED | OUTPATIENT
Start: 2018-05-15 | End: 2018-05-15

## 2018-05-15 RX ORDER — FAMOTIDINE 20 MG/1
20 TABLET ORAL ONCE
Status: DISCONTINUED | OUTPATIENT
Start: 2018-05-15 | End: 2018-05-15 | Stop reason: HOSPADM

## 2018-05-15 RX ORDER — ACETAMINOPHEN 500 MG
1000 TABLET ORAL ONCE
Status: COMPLETED | OUTPATIENT
Start: 2018-05-15 | End: 2018-05-15

## 2018-05-15 RX ORDER — SODIUM CHLORIDE, SODIUM LACTATE, POTASSIUM CHLORIDE, CALCIUM CHLORIDE 600; 310; 30; 20 MG/100ML; MG/100ML; MG/100ML; MG/100ML
INJECTION, SOLUTION INTRAVENOUS CONTINUOUS
Status: DISCONTINUED | OUTPATIENT
Start: 2018-05-15 | End: 2018-05-15

## 2018-05-15 RX ORDER — HYDROCODONE BITARTRATE AND ACETAMINOPHEN 5; 325 MG/1; MG/1
1 TABLET ORAL AS NEEDED
Status: DISCONTINUED | OUTPATIENT
Start: 2018-05-15 | End: 2018-05-15

## 2018-05-15 RX ORDER — EPHEDRINE SULFATE 50 MG/ML
INJECTION, SOLUTION INTRAVENOUS AS NEEDED
Status: DISCONTINUED | OUTPATIENT
Start: 2018-05-15 | End: 2018-05-15 | Stop reason: SURG

## 2018-05-15 RX ORDER — LIDOCAINE HYDROCHLORIDE 20 MG/ML
JELLY TOPICAL AS NEEDED
Status: DISCONTINUED | OUTPATIENT
Start: 2018-05-15 | End: 2018-05-15 | Stop reason: HOSPADM

## 2018-05-15 RX ORDER — HYDROCODONE BITARTRATE AND ACETAMINOPHEN 5; 325 MG/1; MG/1
2 TABLET ORAL AS NEEDED
Status: DISCONTINUED | OUTPATIENT
Start: 2018-05-15 | End: 2018-05-15

## 2018-05-15 RX ORDER — HYDROCODONE BITARTRATE AND ACETAMINOPHEN 5; 325 MG/1; MG/1
1 TABLET ORAL EVERY 4 HOURS PRN
Status: DISCONTINUED | OUTPATIENT
Start: 2018-05-15 | End: 2018-05-15

## 2018-05-15 RX ORDER — DEXTROSE MONOHYDRATE 25 G/50ML
50 INJECTION, SOLUTION INTRAVENOUS
Status: DISCONTINUED | OUTPATIENT
Start: 2018-05-15 | End: 2018-05-15

## 2018-05-15 RX ORDER — HYDROMORPHONE HYDROCHLORIDE 1 MG/ML
0.4 INJECTION, SOLUTION INTRAMUSCULAR; INTRAVENOUS; SUBCUTANEOUS EVERY 5 MIN PRN
Status: DISCONTINUED | OUTPATIENT
Start: 2018-05-15 | End: 2018-05-15

## 2018-05-15 RX ORDER — HYDROCODONE BITARTRATE AND ACETAMINOPHEN 5; 325 MG/1; MG/1
2 TABLET ORAL EVERY 4 HOURS PRN
Status: DISCONTINUED | OUTPATIENT
Start: 2018-05-15 | End: 2018-05-15

## 2018-05-15 RX ORDER — ONDANSETRON 2 MG/ML
4 INJECTION INTRAMUSCULAR; INTRAVENOUS ONCE AS NEEDED
Status: DISCONTINUED | OUTPATIENT
Start: 2018-05-15 | End: 2018-05-15

## 2018-05-15 RX ORDER — METOCLOPRAMIDE 10 MG/1
10 TABLET ORAL ONCE
Status: DISCONTINUED | OUTPATIENT
Start: 2018-05-15 | End: 2018-05-15 | Stop reason: HOSPADM

## 2018-05-15 RX ORDER — NALOXONE HYDROCHLORIDE 0.4 MG/ML
80 INJECTION, SOLUTION INTRAMUSCULAR; INTRAVENOUS; SUBCUTANEOUS AS NEEDED
Status: ACTIVE | OUTPATIENT
Start: 2018-05-15 | End: 2018-05-15

## 2018-05-15 RX ORDER — ONDANSETRON 2 MG/ML
INJECTION INTRAMUSCULAR; INTRAVENOUS AS NEEDED
Status: DISCONTINUED | OUTPATIENT
Start: 2018-05-15 | End: 2018-05-15 | Stop reason: SURG

## 2018-05-15 RX ORDER — HYDROMORPHONE HYDROCHLORIDE 1 MG/ML
0.2 INJECTION, SOLUTION INTRAMUSCULAR; INTRAVENOUS; SUBCUTANEOUS EVERY 5 MIN PRN
Status: DISCONTINUED | OUTPATIENT
Start: 2018-05-15 | End: 2018-05-15

## 2018-05-15 RX ORDER — ACETAMINOPHEN 325 MG/1
650 TABLET ORAL EVERY 4 HOURS PRN
Status: DISCONTINUED | OUTPATIENT
Start: 2018-05-15 | End: 2018-05-15

## 2018-05-15 RX ORDER — SODIUM CHLORIDE, SODIUM LACTATE, POTASSIUM CHLORIDE, CALCIUM CHLORIDE 600; 310; 30; 20 MG/100ML; MG/100ML; MG/100ML; MG/100ML
INJECTION, SOLUTION INTRAVENOUS CONTINUOUS PRN
Status: DISCONTINUED | OUTPATIENT
Start: 2018-05-15 | End: 2018-05-15 | Stop reason: SURG

## 2018-05-15 RX ORDER — HYDROMORPHONE HYDROCHLORIDE 1 MG/ML
0.6 INJECTION, SOLUTION INTRAMUSCULAR; INTRAVENOUS; SUBCUTANEOUS EVERY 5 MIN PRN
Status: DISCONTINUED | OUTPATIENT
Start: 2018-05-15 | End: 2018-05-15

## 2018-05-15 RX ADMIN — EPHEDRINE SULFATE 10 MG: 50 INJECTION, SOLUTION INTRAVENOUS at 07:55:00

## 2018-05-15 RX ADMIN — SODIUM CHLORIDE, SODIUM LACTATE, POTASSIUM CHLORIDE, CALCIUM CHLORIDE: 600; 310; 30; 20 INJECTION, SOLUTION INTRAVENOUS at 07:34:00

## 2018-05-15 RX ADMIN — SODIUM CHLORIDE, SODIUM LACTATE, POTASSIUM CHLORIDE, CALCIUM CHLORIDE: 600; 310; 30; 20 INJECTION, SOLUTION INTRAVENOUS at 08:50:00

## 2018-05-15 RX ADMIN — ONDANSETRON 4 MG: 2 INJECTION INTRAMUSCULAR; INTRAVENOUS at 08:50:00

## 2018-05-15 RX ADMIN — LIDOCAINE HYDROCHLORIDE 50 MG: 10 INJECTION, SOLUTION EPIDURAL; INFILTRATION; INTRACAUDAL; PERINEURAL at 07:39:00

## 2018-05-15 NOTE — PROGRESS NOTES
CBI discontinuation protocol followed per Dr. Fouzia Pritchard. Pt noted w/ urinary retention thereafter; (>1100ml per Bladder Scan). Dr. Fouzia Pritchard notified; order given to reinsert 20 Fr.  Barkley Cathether; very slowly, then release retained urine 400ml at a time

## 2018-05-15 NOTE — PROGRESS NOTES
Pt discharged stable w/ wise catheter intact; u/o pale pink w/o clots. Meatus intact w/ slight swelling. Pt denied discomfort.

## 2018-05-15 NOTE — ANESTHESIA PREPROCEDURE EVALUATION
Anesthesia PreOp Note    HPI:     Juve Ordaz is a 75year old male who presents for preoperative consultation requested by: Murray Richards MD    Date of Surgery: 5/15/2018    Procedure(s):  CYSTOSCOPY  LASER HOLMIUM LITHOTRIPSY  Indication: Bladder Prior to Admission:  Multiple Vitamins-Minerals (CEROVITE SENIOR) Oral Tab Take 1 tablet by mouth daily. Disp: 30 tablet Rfl: 0 5/13/2018   Turmeric 450 MG Oral Cap Take 900 mg by mouth daily.  Disp:  Rfl:  5/7/2018   FENOFIBRATE 145 MG Oral Tab TAKE 1 TABL Reginald Rutherford MD     No current [facility]-ordered outpatient prescriptions on file.     No Known Allergies    Family History   Problem Relation Age of Onset   • Diabetes Sister    • Cancer Sister    • Cancer Brother    • Prostate Cancer Brother    • Kidney Disease Oth mellitus type 1 well controlled using insulin,   Abdominal              Anesthesia Plan:   ASA:  3  Plan:   General  Airway:  LMA  Post-op Pain Management: IV analgesics  Informed Consent Plan and Risks Discussed With:  Patient and spouse      I have infor

## 2018-05-15 NOTE — OPERATIVE REPORT
[facility]    PATIENT'S NAME: Amparo BARKSDALE   ATTENDING PHYSICIAN: Gavin Duenas MD   OPERATING PHYSICIAN: Gavin Duenas MD   PATIENT ACCOUNT#:   [id number]    LOCATION:  [place] [address]  MEDICAL RECORD #:   [id number] the operating room, placed in supine position, induced under general anesthesia, placed in dorsal lithotomy position, prepped and draped in the usual sterile fashion.   I inspected the urethra, prostatic urethra, bladder neck and bladder using Storz 22-Fren

## 2018-05-15 NOTE — ANESTHESIA POSTPROCEDURE EVALUATION
Patient: Vladimir Lieberman    Procedure Summary     Date:  05/15/18 Room / Location:  [address] [place] [address] MAIN OR 14 / [address] [place] [address] MAIN OR    Anesthesia Start:  0594 Anesthesia Stop:      Procedures:       CYSTOSCOPY (N/A )      LASER HOLMIUM LITHOTRIPSY (N/A ) Diagnosis:       Blad

## 2018-05-15 NOTE — H&P
Marcia Barbosa MD   UROLOGY      []Manual[]Template  []Copied  HPI:    Patient ID: Leighann Guzman is a 75year old male.     Kidney Problem   Pertinent negatives include no abdominal pain, chest pain, chills or fever.         Bladder stone  Chronic pro Chronic problems. Patient is followed for hyperuricosuria and hypercalcuria. He reports he is following the recommended diet that was given to him. He feels problem is stable.  He is currently taking chlorthalidone and potassium citrate with moderate impr CHART REVIEW: Please see above. In addition to above. .. Known large BPH for a long time. Cystoscopy with EHL of large 4 cm bladder stone [facility] August 19, 2004. cleared of all bladder stones, but then they returned on plain x-ray.  cystoscopy with laser lithot Restarted alfuzosin 10 mg daily.  07/05/2017 office visit with me; Prostate: 3+ enlarged, 40 g, no palpable nodules or indurations; Start chlorthalidone 25 mg daily; Start potassium citrate 1080 mg tablet, 2 tablets twice daily with food; Continue finasteri Smokeless tobacco: Never Used                      Comment: quit  Alcohol use: No               Comment: social               Current Outpatient Prescriptions:  insulin glargine 100 UNIT/ML Subcutaneous Solution Inject 15 Units into the skin one time.  Disp ibuprofen 600 MG Oral Tab Take 1 tablet (600 mg total) by mouth every 6 (six) hours as needed. Take with food. Stop if stomach upset.  Disp: 40 tablet Rfl: 0      Allergies:No Known Allergies   PHYSICAL EXAM:   Physical Exam   Constitutional: He appears wel 2/27/16 PSA 1.7 x2 for finasteride effect: 3.4   2/23/16 Creatinine 1.363, eGFR 52        IMAGING  01/05/2018 US Kidney/Bladder = Negative for hydronephrosis.   Multiple bladder calculi seen.     01/02/2018 KUB = calcifications -- intrarenal vascular calcif (N21.0) Bladder stone  (primary encounter diagnosis)  01/02/2018 KUB = Innumerable (more than 20) bladder calculi visualized within the bladder measure up to 14 mm in diameter similar to prior abdominal CT.  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 I explained to the patient this is likely caused by high intake of red meat.  I explained treatment option of limiting red meat consumption and pt chooses to follow 7.  Because of increasing size and bladder stone, which is now very large, patient agrees to cystoscopy with laser lithotripsy of bladder stone under general anesthesia.     8.  To take usual daily alfuzosin 10 mg evening before the procedure     9.    no a

## 2018-05-15 NOTE — PROGRESS NOTES
Received report fr Dalia Craig,  At 1215 started opening the CBI with 1000 ml in the bag , urine looks orange to clear.   About 250 ml of CBI fluid in the bladder when he c/o of fullness, clamped CBI and discontinued catheter at 1250 pm with some pinkish urine a

## 2018-05-15 NOTE — TELEPHONE ENCOUNTER
Nurses, just perform surgery on patient. Please fax to Dr. Silvina Durand the Spencer op note\" in  epic for today 5/15/18.   Thanks, Dr. Memo Munroe

## 2018-05-15 NOTE — INTERVAL H&P NOTE
Pre-op Diagnosis: Bladder stone [N21.0]    The above referenced H&P was reviewed by Chaitanya Lopez MD on 5/15/2018, the patient was examined and no significant changes have occurred in the patient's condition since the H&P was performed.   I discussed wi

## 2018-05-15 NOTE — TELEPHONE ENCOUNTER
Urology update    5/15/18 cystoscopy with laser lithotripsy of bladder stones. The following are discharge instructions given to patient. =    1.   Phenazopyridine 200 mg capsule, 1 capsule every 8 hours as needed for burning pain with urination; makes u

## 2018-05-15 NOTE — BRIEF OP NOTE
[facility] POST ANESTHESIA CARE UNIT  Brief Op Note       Patients Name: Alfredo Tonny  Attending Physician: Nicholas Del Valle MD  Operating Physician: Teresa Cruz MD  CSN: [id number]     Location:  OR  MRN: [id number]    Date of Birth: [birth year]

## 2018-05-16 NOTE — TELEPHONE ENCOUNTER
Pt called stating pt's condition has changed and would like to discuss. Pt may have to change the appt 5-17-18. Pt is having pain.    Please call

## 2018-05-16 NOTE — TELEPHONE ENCOUNTER
Nurses, please call patient 5/16/18 Wednesday morning to check on color of urine and if any problems with Barkley catheter and also tentatively arrange for 5/17/18 Thursday morning nurse visit for Barkley catheter removal if color of urine okay.     Note that p

## 2018-05-16 NOTE — TELEPHONE ENCOUNTER
Pt called back and spoke with him. He states his urine is still pink and hurts when he urinates. Asked pt if urine is draining into the bag or leaking around his penis and tubing. He states it is draining into the bag.  Denies being on fluid restriction for

## 2018-05-16 NOTE — TELEPHONE ENCOUNTER
I spoke with pt and determined that the urine color in the wise cath tubing is pink and he has not had any issues with the cath.   I informed him of CRIS's msg and instructions below and I arranged a n/v for tomorrow 5/17 at 8:20 am and told pt that if the

## 2018-05-17 ENCOUNTER — NURSE ONLY (OUTPATIENT)
Dept: SURGERY | Facility: CLINIC | Age: 68
End: 2018-05-17

## 2018-05-17 DIAGNOSIS — Z97.8 FOLEY CATHETER PRESENT: Primary | ICD-10-CM

## 2018-05-17 DIAGNOSIS — N30.01 ACUTE CYSTITIS WITH HEMATURIA: ICD-10-CM

## 2018-05-17 RX ORDER — PHENAZOPYRIDINE HYDROCHLORIDE 200 MG/1
200 TABLET, FILM COATED ORAL 3 TIMES DAILY PRN
Qty: 9 TABLET | Refills: 0 | Status: SHIPPED | OUTPATIENT
Start: 2018-05-17 | End: 2018-06-14 | Stop reason: ALTCHOICE

## 2018-05-17 NOTE — TELEPHONE ENCOUNTER
Noted. See today's nurse visit note. Please note; phenazopyridine 200 mg capsule was not sent to patient's pharmacy. Medication sent. Patient is aware.

## 2018-05-17 NOTE — PROGRESS NOTES
Patient's name and  verified. Patient assisted to exam table. Patient's urine in wise bag is noted to be red, bloody,  punch colored, no clots.   Advised patient that wise catheter is not going to be removed today, advised patient that he needs

## 2018-05-18 ENCOUNTER — TELEPHONE (OUTPATIENT)
Dept: SURGERY | Facility: CLINIC | Age: 68
End: 2018-05-18

## 2018-05-18 NOTE — TELEPHONE ENCOUNTER
pt called. He is not able to come in this morning for a voiding trial and decath. and asked if a RN could call him back. Thank you.

## 2018-05-18 NOTE — TELEPHONE ENCOUNTER
Patient called back. Patient states he would like voiding trial decath Monday morning; urine in wise bag looks \"bright orange\". Patient states urine is better today then yesterday, denies blood; states he increased his fluid intake.   Patient takes Priyanka Meals

## 2018-05-18 NOTE — PROGRESS NOTES
Yes, please obtain urine culture on Friday, 5/18/18 before Barkley catheter removed. Order entered.   Many thanks, Dr. Sharon Oshea

## 2018-05-18 NOTE — TELEPHONE ENCOUNTER
Phoned pt again, and again received voice mail. [email or web address] . RADHA IF PT CALL BACK, PLEASE transfer to urology nurse ext [phone] . Thank you.

## 2018-05-21 ENCOUNTER — NURSE ONLY (OUTPATIENT)
Dept: SURGERY | Facility: CLINIC | Age: 68
End: 2018-05-21

## 2018-05-21 DIAGNOSIS — R30.0 DYSURIA: Primary | ICD-10-CM

## 2018-05-21 PROCEDURE — 51700 IRRIGATION OF BLADDER: CPT | Performed by: UROLOGY

## 2018-05-21 NOTE — PROGRESS NOTES
Patient's name and  verified. Patient's urine in wise bag noted to be clear bright orange. Patient confirms he is taking pyridium. Wise catheter clamped for 10 minutes to obtain sterile urine specimen for urine culture, per MD's order.   Patient's fo

## 2018-05-22 NOTE — OPERATIVE REPORT
[facility]    PATIENT'S NAME: Alirio BARKSDALE   ATTENDING PHYSICIAN: Bebe Cho MD   OPERATING PHYSICIAN: Bebe Cho MD   PATIENT ACCOUNT#:   [id number]    LOCATION:  [address]  MEDICAL RECORD #:   [id number] general anesthesia, placed in dorsal lithotomy position, prepped and draped in the usual sterile fashion.   I inspected the urethra, prostatic urethra, bladder neck and bladder using Storz 22-Afghan cystoscope sheath using 30-degree and 70-degree and 120-de Jesse 25 [id number]  CRIS/

## 2018-06-04 RX ORDER — CHLORTHALIDONE 25 MG/1
TABLET ORAL
Qty: 30 TABLET | Refills: 11 | Status: SHIPPED | OUTPATIENT
Start: 2018-06-04

## 2018-06-04 NOTE — TELEPHONE ENCOUNTER
Dr. Becki Christensen, pt [facility] 4/26/18 pt pharmacy requesting refill on chlorthalidone if you agree please review and sign med, thank you. I copied and pasted part of your last note below.     1.  Continue alfuzosin 10 mg daily     2.   Continue finasteride 5 mg carol ann

## 2018-06-14 ENCOUNTER — OFFICE VISIT (OUTPATIENT)
Dept: SURGERY | Facility: CLINIC | Age: 68
End: 2018-06-14

## 2018-06-14 ENCOUNTER — TELEPHONE (OUTPATIENT)
Dept: SURGERY | Facility: CLINIC | Age: 68
End: 2018-06-14

## 2018-06-14 VITALS
SYSTOLIC BLOOD PRESSURE: 100 MMHG | HEART RATE: 80 BPM | BODY MASS INDEX: 29.12 KG/M2 | TEMPERATURE: 98 F | DIASTOLIC BLOOD PRESSURE: 60 MMHG | WEIGHT: 208 LBS | HEIGHT: 71 IN | RESPIRATION RATE: 16 BRPM

## 2018-06-14 DIAGNOSIS — R35.1 NOCTURIA: ICD-10-CM

## 2018-06-14 DIAGNOSIS — N52.01 ERECTILE DYSFUNCTION DUE TO ARTERIAL INSUFFICIENCY: ICD-10-CM

## 2018-06-14 DIAGNOSIS — N20.0 KIDNEY STONE: ICD-10-CM

## 2018-06-14 DIAGNOSIS — R82.993 HYPERURICOSURIA: ICD-10-CM

## 2018-06-14 DIAGNOSIS — N40.1 BPH WITH URINARY OBSTRUCTION: ICD-10-CM

## 2018-06-14 DIAGNOSIS — N13.8 BPH WITH URINARY OBSTRUCTION: ICD-10-CM

## 2018-06-14 DIAGNOSIS — N19 RENAL FAILURE, UNSPECIFIED CHRONICITY: ICD-10-CM

## 2018-06-14 DIAGNOSIS — N40.1 BENIGN PROSTATIC HYPERPLASIA WITH URINARY FREQUENCY: Primary | ICD-10-CM

## 2018-06-14 DIAGNOSIS — N21.0 BLADDER STONE: ICD-10-CM

## 2018-06-14 DIAGNOSIS — R35.0 BENIGN PROSTATIC HYPERPLASIA WITH URINARY FREQUENCY: Primary | ICD-10-CM

## 2018-06-14 DIAGNOSIS — R82.994 HYPERCALCIURIA: ICD-10-CM

## 2018-06-14 PROCEDURE — 99215 OFFICE O/P EST HI 40 MIN: CPT | Performed by: UROLOGY

## 2018-06-14 PROCEDURE — G0463 HOSPITAL OUTPT CLINIC VISIT: HCPCS | Performed by: UROLOGY

## 2018-06-14 NOTE — TELEPHONE ENCOUNTER
Patient seen in office, scheduled for cysto, green light laser, Tuesday 07/10/18 @ 7:30, [facility]/outpatient went over labs/pre-op with patient verbalized understanding.

## 2018-06-14 NOTE — PATIENT INSTRUCTIONS
1   continue finasteride 5 mg daily    2. Continue alfuzosin 10 mg daily    3. Transrectal ultrasound of the prostate at [facility] biopsy. My nurses will help you schedule it or advise you on that. ---  Please call office 1--2 days a

## 2018-06-14 NOTE — PROGRESS NOTES
HPI:    Patient ID: Annetta Shin is a 75year old male. HPI     History provided by pt and wife. Bladder stone  Chronic problem.  01/02/2018 KUB = Innumerable (more than 20) bladder calculi visualized within the bladder measure up to 14 mm in diame hyperuricosuria and hypercalcuria. He reports he is following the recommended diet that was given to him. He feels problem is stable. He is currently taking chlorthalidone 25 mg and potassium citrate 1080 mg BID with moderate improvement.       Increased t another stone which was 5 mm in size and the left ureter could not be treated because the offending stone was so large.  Patient had persistent stone fragment in the left ureter which increase in size to 8 mm. 7/30/13 cystoscopy with left ureteroscopy with stream, urinary hesitancy and nocturia 2x   Skin: Negative for color change. Neurological: Negative for speech difficulty. Psychiatric/Behavioral: The patient is not nervous/anxious.             Current Outpatient Prescriptions:  CHLORTHALIDONE 25 MG Or of cystoscopy 2013    stent removal   • Hyperlipidemia    • Kidney stones    • Other and unspecified hyperlipidemia    • Shingles    • Type II or unspecified type diabetes mellitus without mention of complication, not stated as uncontrolled    • Urolithias There were no vitals filed for this visit. There is no height or weight on file to calculate BMI.      LABS:  05/21/2018 Urine Culture = No Growth  05/15/2018 Calculi Composition = calcium oxalate monohydrate  19/01/2162 metabolic 85-UE urine total moderately obstructing calculus at the left UPJ; Nonobstructing 0.7 x 1.3 x 1.0 calculus in the left renal pelvis; Nonobstructing calculi left kidney measuring 0.5 and 0.6 cm in diameter;  Additional calcifications noted in the left renal hilum, vascular in Light laser ablation under general anesthesia--he agrees to do so.     (N20.0) Kidney stone  01/02/2018 KUB = calcifications -- may be additional tiny punctuate nonobstructing calculi measuring a few millimeters in diameter.  I discussed treatment options a voiding dysfunction category, on 04/26/2018 per chart review.  Patient feels this problem is stable; I discussed, explained, and answered questions on treatment options and patient understood and chooses to continue alfuzosin hcl er 10 mg    I explained to Referrals:  None     ID#[id number]    By signing my name below, Ruddy Chavarria,  attest that this documentation has been prepared under the direction and in the presence of Gisell Brannon MD.   Electronically Signed: Austyn Aldridge, 6/14/2018, 1:10

## 2018-06-25 ENCOUNTER — HOSPITAL ENCOUNTER (OUTPATIENT)
Dept: ULTRASOUND IMAGING | Facility: HOSPITAL | Age: 68
Discharge: HOME | End: 2018-06-25
Attending: UROLOGY
Payer: MEDICARE

## 2018-06-25 ENCOUNTER — TELEPHONE (OUTPATIENT)
Dept: SURGERY | Facility: CLINIC | Age: 68
End: 2018-06-25

## 2018-06-25 DIAGNOSIS — R33.9 RETENTION OF URINE, UNSPECIFIED: ICD-10-CM

## 2018-06-25 DIAGNOSIS — N40.1 HYPERTROPHY OF PROSTATE WITH URINARY OBSTRUCTION: Primary | ICD-10-CM

## 2018-06-25 DIAGNOSIS — N40.1 HYPERTROPHY OF PROSTATE WITH URINARY OBSTRUCTION: ICD-10-CM

## 2018-06-25 DIAGNOSIS — N13.8 HYPERTROPHY OF PROSTATE WITH URINARY OBSTRUCTION: Primary | ICD-10-CM

## 2018-06-25 DIAGNOSIS — N13.8 HYPERTROPHY OF PROSTATE WITH URINARY OBSTRUCTION: ICD-10-CM

## 2018-06-25 PROCEDURE — 76872 US TRANSRECTAL: CPT | Performed by: UROLOGY

## 2018-06-25 NOTE — TELEPHONE ENCOUNTER
Per Dr. Becki RIVERA; diagnosis for us of prostate = R33.9, N13.8 , N40.1    Order regenerated using all 3 diagnosis codes.

## 2018-06-25 NOTE — TELEPHONE ENCOUNTER
[facility] calling stating that patient is scheduled for a Transrectal Ultrasound of Prostate no biopsy today @ 1:00 pm, but diagnosis will not pass.       Patient's

## 2018-06-25 NOTE — TELEPHONE ENCOUNTER
Harriet Tariq from radiology registration ([phone] [id number])called to say the diagnosos code on the order will not be accepted by insurance. Printed prostate US order, and put it on his desk with a note for him to choose a different diagnosis.  Will call her back

## 2018-07-01 NOTE — TELEPHONE ENCOUNTER
Kirill Aguirre, please put copy of prostate ultrasound report as well as a copy of \"result note\" \"my chart\" message to patient into preop packet.   Thanks, Dr. Sulema Hoffman

## 2018-07-02 ENCOUNTER — TELEPHONE (OUTPATIENT)
Dept: SURGERY | Facility: CLINIC | Age: 68
End: 2018-07-02

## 2018-07-02 DIAGNOSIS — N40.0 BENIGN PROSTATIC HYPERPLASIA WITHOUT LOWER URINARY TRACT SYMPTOMS: Primary | ICD-10-CM

## 2018-07-02 NOTE — TELEPHONE ENCOUNTER
Per , yes please cancel procedure. L/m on v/m informing patient that 07/10/18 procedure will be cancelled.

## 2018-07-02 NOTE — TELEPHONE ENCOUNTER
Pt states he saw cardiologist Dr. Wood Mahmood states he had an abnormal nuclear stress test, and he wants pt to have an angiogram next, pt was also advised to post pone 7/10 sx, for any questions pls call pt or Dr. Kaley Cao at [phone]. Thank you.

## 2018-08-13 PROBLEM — H04.123 DRY EYE SYNDROME OF BILATERAL LACRIMAL GLANDS: Status: ACTIVE | Noted: 2018-08-13

## 2018-08-13 PROBLEM — H25.13 NUCLEAR SCLEROTIC CATARACT OF BOTH EYES: Status: ACTIVE | Noted: 2018-08-13

## 2018-08-13 PROBLEM — E11.9 TYPE 2 DIABETES MELLITUS WITHOUT RETINOPATHY (HCC): Status: ACTIVE | Noted: 2018-08-13

## 2019-04-05 RX ORDER — ALFUZOSIN HYDROCHLORIDE 10 MG/1
TABLET, EXTENDED RELEASE ORAL
Qty: 90 TABLET | Refills: 0 | Status: SHIPPED | OUTPATIENT
Start: 2019-04-05 | End: 2019-08-18

## 2019-04-05 NOTE — TELEPHONE ENCOUNTER
Pt LOV 6/14/18 pt pharmacy requesting refill on alfuzosin if you agree please review and sign med. I copied and pasted part of PVK note below. 1   continue finasteride 5 mg daily     2.   Continue alfuzosin 10 mg daily

## 2019-08-20 RX ORDER — ALFUZOSIN HYDROCHLORIDE 10 MG/1
TABLET, EXTENDED RELEASE ORAL
Qty: 90 TABLET | Refills: 0 | Status: SHIPPED | OUTPATIENT
Start: 2019-08-20

## 2019-08-27 PROBLEM — H25.811 COMBINED FORMS OF AGE-RELATED CATARACT OF RIGHT EYE: Status: ACTIVE | Noted: 2019-08-27

## 2020-01-30 RX ORDER — ALFUZOSIN HYDROCHLORIDE 10 MG/1
TABLET, EXTENDED RELEASE ORAL
Qty: 90 TABLET | Refills: 0 | OUTPATIENT
Start: 2020-01-30

## 2020-01-30 NOTE — TELEPHONE ENCOUNTER
Pt LOV with Dr. Jovana Mooney 6/14/18 pt pharmacy requesting refill on alfuzosin, pt has no upcoming megan refill denied pt needs to make megan.

## 2021-03-08 DIAGNOSIS — Z23 NEED FOR VACCINATION: ICD-10-CM

## 2021-03-12 ENCOUNTER — IMMUNIZATION (OUTPATIENT)
Dept: LAB | Facility: HOSPITAL | Age: 71
End: 2021-03-12
Attending: HOSPITALIST
Payer: MEDICARE

## 2021-03-12 DIAGNOSIS — Z23 NEED FOR VACCINATION: Primary | ICD-10-CM

## 2021-03-12 PROCEDURE — 0011A SARSCOV2 VAC 100MCG/0.5ML IM: CPT

## 2021-04-09 ENCOUNTER — IMMUNIZATION (OUTPATIENT)
Dept: LAB | Facility: HOSPITAL | Age: 71
End: 2021-04-09
Attending: EMERGENCY MEDICINE
Payer: MEDICARE

## 2021-04-09 DIAGNOSIS — Z23 NEED FOR VACCINATION: Primary | ICD-10-CM

## 2021-04-09 PROCEDURE — 0012A SARSCOV2 VAC 100MCG/0.5ML IM: CPT

## 2022-10-04 ENCOUNTER — APPOINTMENT (OUTPATIENT)
Dept: URBAN - METROPOLITAN AREA CLINIC 240 | Age: 72
Setting detail: DERMATOLOGY
End: 2022-10-04

## 2022-10-04 DIAGNOSIS — L82.1 OTHER SEBORRHEIC KERATOSIS: ICD-10-CM

## 2022-10-04 DIAGNOSIS — L70.8 OTHER ACNE: ICD-10-CM

## 2022-10-04 DIAGNOSIS — Z71.89 OTHER SPECIFIED COUNSELING: ICD-10-CM

## 2022-10-04 DIAGNOSIS — D18.0 HEMANGIOMA: ICD-10-CM

## 2022-10-04 DIAGNOSIS — L81.4 OTHER MELANIN HYPERPIGMENTATION: ICD-10-CM

## 2022-10-04 DIAGNOSIS — D22 MELANOCYTIC NEVI: ICD-10-CM

## 2022-10-04 PROBLEM — D18.01 HEMANGIOMA OF SKIN AND SUBCUTANEOUS TISSUE: Status: ACTIVE | Noted: 2022-10-04

## 2022-10-04 PROBLEM — D22.5 MELANOCYTIC NEVI OF TRUNK: Status: ACTIVE | Noted: 2022-10-04

## 2022-10-04 PROCEDURE — 99213 OFFICE O/P EST LOW 20 MIN: CPT

## 2022-10-04 PROCEDURE — OTHER COUNSELING: OTHER

## 2022-10-04 PROCEDURE — OTHER MIPS QUALITY: OTHER

## 2022-10-04 ASSESSMENT — LOCATION DETAILED DESCRIPTION DERM
LOCATION DETAILED: STERNAL NOTCH
LOCATION DETAILED: RIGHT SUPERIOR MEDIAL UPPER BACK
LOCATION DETAILED: LEFT DISTAL POSTERIOR THIGH
LOCATION DETAILED: INFERIOR THORACIC SPINE
LOCATION DETAILED: RIGHT SUPERIOR MEDIAL MIDBACK

## 2022-10-04 ASSESSMENT — LOCATION ZONE DERM
LOCATION ZONE: LEG
LOCATION ZONE: TRUNK

## 2022-10-04 ASSESSMENT — LOCATION SIMPLE DESCRIPTION DERM
LOCATION SIMPLE: UPPER BACK
LOCATION SIMPLE: LEFT POSTERIOR THIGH
LOCATION SIMPLE: RIGHT LOWER BACK
LOCATION SIMPLE: RIGHT UPPER BACK
LOCATION SIMPLE: CHEST

## 2023-10-17 ENCOUNTER — APPOINTMENT (OUTPATIENT)
Dept: URBAN - METROPOLITAN AREA CLINIC 246 | Age: 73
Setting detail: DERMATOLOGY
End: 2023-10-24

## 2023-10-17 DIAGNOSIS — Z71.89 OTHER SPECIFIED COUNSELING: ICD-10-CM

## 2023-10-17 DIAGNOSIS — Z12.83 ENCOUNTER FOR SCREENING FOR MALIGNANT NEOPLASM OF SKIN: ICD-10-CM

## 2023-10-17 DIAGNOSIS — L91.8 OTHER HYPERTROPHIC DISORDERS OF THE SKIN: ICD-10-CM

## 2023-10-17 DIAGNOSIS — L82.0 INFLAMED SEBORRHEIC KERATOSIS: ICD-10-CM

## 2023-10-17 DIAGNOSIS — L81.4 OTHER MELANIN HYPERPIGMENTATION: ICD-10-CM

## 2023-10-17 DIAGNOSIS — D22 MELANOCYTIC NEVI: ICD-10-CM

## 2023-10-17 DIAGNOSIS — L82.1 OTHER SEBORRHEIC KERATOSIS: ICD-10-CM

## 2023-10-17 DIAGNOSIS — D49.2 NEOPLASM OF UNSPECIFIED BEHAVIOR OF BONE, SOFT TISSUE, AND SKIN: ICD-10-CM

## 2023-10-17 DIAGNOSIS — L70.0 ACNE VULGARIS: ICD-10-CM

## 2023-10-17 DIAGNOSIS — D18.0 HEMANGIOMA: ICD-10-CM

## 2023-10-17 PROBLEM — D22.5 MELANOCYTIC NEVI OF TRUNK: Status: ACTIVE | Noted: 2023-10-17

## 2023-10-17 PROBLEM — D18.01 HEMANGIOMA OF SKIN AND SUBCUTANEOUS TISSUE: Status: ACTIVE | Noted: 2023-10-17

## 2023-10-17 PROCEDURE — OTHER MIPS QUALITY: OTHER

## 2023-10-17 PROCEDURE — 99213 OFFICE O/P EST LOW 20 MIN: CPT | Mod: 25

## 2023-10-17 PROCEDURE — OTHER LIQUID NITROGEN: OTHER

## 2023-10-17 PROCEDURE — 17110 DESTRUCT B9 LESION 1-14: CPT

## 2023-10-17 PROCEDURE — 11102 TANGNTL BX SKIN SINGLE LES: CPT | Mod: 59

## 2023-10-17 PROCEDURE — A4550 SURGICAL TRAYS: HCPCS

## 2023-10-17 PROCEDURE — OTHER COUNSELING: OTHER

## 2023-10-17 PROCEDURE — OTHER BIOPSY BY SHAVE METHOD: OTHER

## 2023-10-17 ASSESSMENT — LOCATION DETAILED DESCRIPTION DERM
LOCATION DETAILED: RIGHT INFERIOR UPPER BACK
LOCATION DETAILED: LEFT SUPERIOR ANTERIOR NECK
LOCATION DETAILED: PERIUMBILICAL SKIN
LOCATION DETAILED: RIGHT INFERIOR CENTRAL MALAR CHEEK
LOCATION DETAILED: NASAL TIP

## 2023-10-17 ASSESSMENT — LOCATION SIMPLE DESCRIPTION DERM
LOCATION SIMPLE: LEFT ANTERIOR NECK
LOCATION SIMPLE: ABDOMEN
LOCATION SIMPLE: NOSE
LOCATION SIMPLE: RIGHT UPPER BACK
LOCATION SIMPLE: RIGHT CHEEK

## 2023-10-17 ASSESSMENT — LOCATION ZONE DERM
LOCATION ZONE: FACE
LOCATION ZONE: NECK
LOCATION ZONE: TRUNK
LOCATION ZONE: NOSE

## 2023-10-17 NOTE — PROCEDURE: BIOPSY BY SHAVE METHOD
Detail Level: Detailed
Depth Of Biopsy: dermis
Was A Bandage Applied: Yes
Size Of Lesion In Cm: 1.5
X Size Of Lesion In Cm: 0
Biopsy Type: H and E
Biopsy Method: Dermablade
Anesthesia Type: 1% lidocaine with epinephrine
Anesthesia Volume In Cc (Will Not Render If 0): 0.5
Hemostasis: Drysol
Wound Care: Petrolatum
Dressing: bandage
Destruction After The Procedure: No
Type Of Destruction Used: Curettage
Curettage Text: The wound bed was treated with curettage after the biopsy was performed.
Cryotherapy Text: The wound bed was treated with cryotherapy after the biopsy was performed.
Electrodesiccation Text: The wound bed was treated with electrodesiccation after the biopsy was performed.
Electrodesiccation And Curettage Text: The wound bed was treated with electrodesiccation and curettage after the biopsy was performed.
Silver Nitrate Text: The wound bed was treated with silver nitrate after the biopsy was performed.
Lab: -[id number]
Consent: Written consent was obtained and risks were reviewed including but not limited to scarring, infection, bleeding, scabbing, incomplete removal, nerve damage and allergy to anesthesia.
Post-Care Instructions: I reviewed with the patient in detail post-care instructions. Patient is to keep the biopsy site dry overnight, and then apply bacitracin twice daily until healed. Patient may apply hydrogen peroxide soaks to remove any crusting.
Notification Instructions: Patient will be notified of biopsy results. However, patient instructed to call the office if not contacted within 2 weeks.
Billing Type: Third-Party Bill
Information: Selecting Yes will display possible errors in your note based on the variables you have selected. This validation is only offered as a suggestion for you. PLEASE NOTE THAT THE VALIDATION TEXT WILL BE REMOVED WHEN YOU FINALIZE YOUR NOTE. IF YOU WANT TO FAX A PRELIMINARY NOTE YOU WILL NEED TO TOGGLE THIS TO 'NO' IF YOU DO NOT WANT IT IN YOUR FAXED NOTE.

## 2023-10-17 NOTE — PROCEDURE: COUNSELING
Full physical after age 49    Check BMPtoday    meds refilled    Quit chew.   Try losenges.   Consider chantix.  
Detail Level: Generalized
Detail Level: Zone
Detail Level: Detailed
Topical Sulfur Applications Pregnancy And Lactation Text: This medication is Pregnancy Category C and has an unknown safety profile during pregnancy. It is unknown if this topical medication is excreted in breast milk.
Topical Retinoid counseling:  Patient advised to apply a pea-sized amount only at bedtime and wait 30 minutes after washing their face before applying.  If too drying, patient may add a non-comedogenic moisturizer. The patient verbalized understanding of the proper use and possible adverse effects of retinoids.  All of the patient's questions and concerns were addressed.
Dapsone Pregnancy And Lactation Text: This medication is Pregnancy Category C and is not considered safe during pregnancy or breast feeding.
Azithromycin Counseling:  I discussed with the patient the risks of azithromycin including but not limited to GI upset, allergic reaction, drug rash, diarrhea, and yeast infections.
Use Enhanced Medication Counseling?: No
Tetracycline Pregnancy And Lactation Text: This medication is Pregnancy Category D and not consider safe during pregnancy. It is also excreted in breast milk.
Minocycline Counseling: Patient advised regarding possible photosensitivity and discoloration of the teeth, skin, lips, tongue and gums.  Patient instructed to avoid sunlight, if possible.  When exposed to sunlight, patients should wear protective clothing, sunglasses, and sunscreen.  The patient was instructed to call the office immediately if the following severe adverse effects occur:  hearing changes, easy bruising/bleeding, severe headache, or vision changes.  The patient verbalized understanding of the proper use and possible adverse effects of minocycline.  All of the patient's questions and concerns were addressed.
Doxycycline Pregnancy And Lactation Text: This medication is Pregnancy Category D and not consider safe during pregnancy. It is also excreted in breast milk but is considered safe for shorter treatment courses.
Bactrim Counseling:  I discussed with the patient the risks of sulfa antibiotics including but not limited to GI upset, allergic reaction, drug rash, diarrhea, dizziness, photosensitivity, and yeast infections.  Rarely, more serious reactions can occur including but not limited to aplastic anemia, agranulocytosis, methemoglobinemia, blood dyscrasias, liver or kidney failure, lung infiltrates or desquamative/blistering drug rashes.
Aklief Pregnancy And Lactation Text: It is unknown if this medication is safe to use during pregnancy.  It is unknown if this medication is excreted in breast milk.  Breastfeeding women should use the topical cream on the smallest area of the skin for the shortest time needed while breastfeeding.  Do not apply to nipple and areola.
Tazorac Counseling:  Patient advised that medication is irritating and drying.  Patient may need to apply sparingly and wash off after an hour before eventually leaving it on overnight.  The patient verbalized understanding of the proper use and possible adverse effects of tazorac.  All of the patient's questions and concerns were addressed.
Winlevi Pregnancy And Lactation Text: This medication is considered safe during pregnancy and breastfeeding.
Azelaic Acid Counseling: Patient counseled that medicine may cause skin irritation and to avoid applying near the eyes.  In the event of skin irritation, the patient was advised to reduce the amount of the drug applied or use it less frequently.   The patient verbalized understanding of the proper use and possible adverse effects of azelaic acid.  All of the patient's questions and concerns were addressed.
Tazorac Pregnancy And Lactation Text: This medication is not safe during pregnancy. It is unknown if this medication is excreted in breast milk.
Bactrim Pregnancy And Lactation Text: This medication is Pregnancy Category D and is known to cause fetal risk.  It is also excreted in breast milk.
Birth Control Pills Pregnancy And Lactation Text: This medication should be avoided if pregnant and for the first 30 days post-partum.
Topical Clindamycin Pregnancy And Lactation Text: This medication is Pregnancy Category B and is considered safe during pregnancy. It is unknown if it is excreted in breast milk.
Detail Level: Simple
Isotretinoin Counseling: Patient should get monthly blood tests, not donate blood, not drive at night if vision affected, not share medication, and not undergo elective surgery for 6 months after tx completed. Side effects reviewed, pt to contact office should one occur.
Spironolactone Pregnancy And Lactation Text: This medication can cause feminization of the male fetus and should be avoided during pregnancy. The active metabolite is also found in breast milk.
High Dose Vitamin A Counseling: Side effects reviewed, pt to contact office should one occur.
Benzoyl Peroxide Counseling: Patient counseled that medicine may cause skin irritation and bleach clothing.  In the event of skin irritation, the patient was advised to reduce the amount of the drug applied or use it less frequently.   The patient verbalized understanding of the proper use and possible adverse effects of benzoyl peroxide.  All of the patient's questions and concerns were addressed.
High Dose Vitamin A Pregnancy And Lactation Text: High dose vitamin A therapy is contraindicated during pregnancy and breast feeding.
Benzoyl Peroxide Pregnancy And Lactation Text: This medication is Pregnancy Category C. It is unknown if benzoyl peroxide is excreted in breast milk.
Tetracycline Counseling: Patient counseled regarding possible photosensitivity and increased risk for sunburn.  Patient instructed to avoid sunlight, if possible.  When exposed to sunlight, patients should wear protective clothing, sunglasses, and sunscreen.  The patient was instructed to call the office immediately if the following severe adverse effects occur:  hearing changes, easy bruising/bleeding, severe headache, or vision changes.  The patient verbalized understanding of the proper use and possible adverse effects of tetracycline.  All of the patient's questions and concerns were addressed. Patient understands to avoid pregnancy while on therapy due to potential birth defects.
Aklief counseling:  Patient advised to apply a pea-sized amount only at bedtime and wait 30 minutes after washing their face before applying.  If too drying, patient may add a non-comedogenic moisturizer.  The most commonly reported side effects including irritation, redness, scaling, dryness, stinging, burning, itching, and increased risk of sunburn.  The patient verbalized understanding of the proper use and possible adverse effects of retinoids.  All of the patient's questions and concerns were addressed.
Azithromycin Pregnancy And Lactation Text: This medication is considered safe during pregnancy and is also secreted in breast milk.
Doxycycline Counseling:  Patient counseled regarding possible photosensitivity and increased risk for sunburn.  Patient instructed to avoid sunlight, if possible.  When exposed to sunlight, patients should wear protective clothing, sunglasses, and sunscreen.  The patient was instructed to call the office immediately if the following severe adverse effects occur:  hearing changes, easy bruising/bleeding, severe headache, or vision changes.  The patient verbalized understanding of the proper use and possible adverse effects of doxycycline.  All of the patient's questions and concerns were addressed.
Winlevi Counseling:  I discussed with the patient the risks of topical clascoterone including but not limited to erythema, scaling, itching, and stinging. Patient voiced their understanding.
Topical Retinoid Pregnancy And Lactation Text: This medication is Pregnancy Category C. It is unknown if this medication is excreted in breast milk.
Erythromycin Counseling:  I discussed with the patient the risks of erythromycin including but not limited to GI upset, allergic reaction, drug rash, diarrhea, increase in liver enzymes, and yeast infections.
Birth Control Pills Counseling: Birth Control Pill Counseling: I discussed with the patient the potential side effects of OCPs including but not limited to increased risk of stroke, heart attack, thrombophlebitis, deep venous thrombosis, hepatic adenomas, breast changes, GI upset, headaches, and depression.  The patient verbalized understanding of the proper use and possible adverse effects of OCPs. All of the patient's questions and concerns were addressed.
Erythromycin Pregnancy And Lactation Text: This medication is Pregnancy Category B and is considered safe during pregnancy. It is also excreted in breast milk.
Sarecycline Counseling: Patient advised regarding possible photosensitivity and discoloration of the teeth, skin, lips, tongue and gums.  Patient instructed to avoid sunlight, if possible.  When exposed to sunlight, patients should wear protective clothing, sunglasses, and sunscreen.  The patient was instructed to call the office immediately if the following severe adverse effects occur:  hearing changes, easy bruising/bleeding, severe headache, or vision changes.  The patient verbalized understanding of the proper use and possible adverse effects of sarecycline.  All of the patient's questions and concerns were addressed.
Spironolactone Counseling: Patient advised regarding risks of diarrhea, abdominal pain, hyperkalemia, birth defects (for female patients), liver toxicity and renal toxicity. The patient may need blood work to monitor liver and kidney function and potassium levels while on therapy. The patient verbalized understanding of the proper use and possible adverse effects of spironolactone.  All of the patient's questions and concerns were addressed.
Azelaic Acid Pregnancy And Lactation Text: This medication is considered safe during pregnancy and breast feeding.
Topical Clindamycin Counseling: Patient counseled that this medication may cause skin irritation or allergic reactions.  In the event of skin irritation, the patient was advised to reduce the amount of the drug applied or use it less frequently.   The patient verbalized understanding of the proper use and possible adverse effects of clindamycin.  All of the patient's questions and concerns were addressed.
Topical Sulfur Applications Counseling: Topical Sulfur Counseling: Patient counseled that this medication may cause skin irritation or allergic reactions.  In the event of skin irritation, the patient was advised to reduce the amount of the drug applied or use it less frequently.   The patient verbalized understanding of the proper use and possible adverse effects of topical sulfur application.  All of the patient's questions and concerns were addressed.
Dapsone Counseling: I discussed with the patient the risks of dapsone including but not limited to hemolytic anemia, agranulocytosis, rashes, methemoglobinemia, kidney failure, peripheral neuropathy, headaches, GI upset, and liver toxicity.  Patients who start dapsone require monitoring including baseline LFTs and weekly CBCs for the first month, then every month thereafter.  The patient verbalized understanding of the proper use and possible adverse effects of dapsone.  All of the patient's questions and concerns were addressed.
Isotretinoin Pregnancy And Lactation Text: This medication is Pregnancy Category X and is considered extremely dangerous during pregnancy. It is unknown if it is excreted in breast milk.

## 2023-10-17 NOTE — HPI: FULL BODY SKIN EXAMINATION
Regarding: Covid-Symptoms-Fever  ----- Message from [facility] sent at 1/17/2022 12:37 PM EST -----  "My dad has congestion, body aches and a fever of 101 7 "
What Type Of Note Output Would You Prefer (Optional)?: Bullet Format
What Is The Reason For Today's Visit?: Full Body Skin Examination
What Is The Reason For Today's Visit? (Being Monitored For X): surveillance against the recurrence of atypical nevi

## 2023-11-07 ENCOUNTER — APPOINTMENT (OUTPATIENT)
Dept: URBAN - METROPOLITAN AREA CLINIC 246 | Age: 73
Setting detail: DERMATOLOGY
End: 2023-11-10

## 2023-11-07 DIAGNOSIS — R21 RASH AND OTHER NONSPECIFIC SKIN ERUPTION: ICD-10-CM

## 2023-11-07 PROCEDURE — A4550 SURGICAL TRAYS: HCPCS

## 2023-11-07 PROCEDURE — OTHER BIOPSY BY PUNCH METHOD: OTHER

## 2023-11-07 PROCEDURE — 11104 PUNCH BX SKIN SINGLE LESION: CPT

## 2023-11-07 ASSESSMENT — LOCATION ZONE DERM: LOCATION ZONE: TRUNK

## 2023-11-07 ASSESSMENT — LOCATION SIMPLE DESCRIPTION DERM: LOCATION SIMPLE: RIGHT LOWER BACK

## 2023-11-07 ASSESSMENT — LOCATION DETAILED DESCRIPTION DERM: LOCATION DETAILED: RIGHT INFERIOR MEDIAL MIDBACK

## 2023-11-07 NOTE — PROCEDURE: BIOPSY BY PUNCH METHOD
Path Notes (To The Dermatopathologist): Previous biopsy performed on 10/17/23 acc #[id number] Path Notes (To The Dermatopathologist): Previous biopsy performed on 10/17/23 acc #[id number]

## 2023-11-07 NOTE — PROCEDURE: BIOPSY BY PUNCH METHOD
Hospital Discharge Follow-Up      Date/Time:  2019 12:32 PM    Patient was admitted to [place] [address] on 19 and discharged on 19 for elective back surgery. The physician discharge summary was available at the time of outreach. Patient was contacted within 2 business days of discharge. Top Challenges reviewed with the provider   Treated for UTI pre-op - repeat urine culture  negative. Taking Levaquin through   Urology follow-up , Ortho follow-up , PCP follow-up     Received 2 units PRBC's on  (Hgb 6.6) - Hgb 10.5 on  - repeat CBC    Patient would like new order for glucose meter. A1C 6.6 19       Method of communication with provider :chart routing    Inpatient RRAT score: 15  Was this a readmission? no   Patient stated reason for the readmission: n/a    Nurse Navigator (NN) contacted the family by telephone to perform post hospital discharge assessment. Verified name and  with family as identifiers. Provided introduction to self, and explanation of the Nurse Navigator role. Reviewed discharge instructions and red flags with family who verbalized understanding. Family given an opportunity to ask questions and does not have any further questions or concerns at this time. The family agrees to contact the PCP office for questions related to their healthcare. NN provided contact information for future reference. Disease Specific:   N/A    Summary of patient's top problems:  1. Adjacent Segment Disease with spinal stenosis - surgery . Home Health PT/OT/SN. Follow-up with ortho .   2. UTI - treated post-op. Discharged on [address] Repeat urine cx negative. 3. DM - A1C 6.6. Needs new glucose meter.      Home Health orders at discharge: PT, OT, [address]: At [address]  Date of initial visit: 19    Durable Medical Equipment ordered/company: none  Durable Medical Equipment received: n/a    Barriers to care? none identified    Advance Care Planning:   Does patient have an Advance Directive:  not on file     Medication(s):   New Medications at Discharge: Vitamin D, Levaquin, Flomax  Changed Medications at Discharge: percocet  Discontinued Medications at Discharge: none    Medication reconciliation was performed with family, who verbalizes understanding of administration of home medications. There were no barriers to obtaining medications identified at this time. Referral to Pharm D needed: no     Current Outpatient Medications   Medication Sig    levoFLOXacin (LEVAQUIN) 500 mg tablet Take 1 Tab by mouth daily for 7 days.  tamsulosin (FLOMAX) 0.4 mg capsule Take 1 Cap by mouth daily.  oxyCODONE-acetaminophen (PERCOCET) 5-325 mg per tablet Take 1-2 Tabs by mouth every six (6) hours as needed for Pain for up to 7 days. Max Daily Amount: 8 Tabs. Indications: pain    folic acid (FOLVITE) 1 mg tablet Take 1 mg by mouth daily.  cholecalciferol, VITAMIN D3, (VITAMIN D3) 5,000 unit tab tablet Take 1 Tab by mouth daily for 30 days. Please start today and take until surgery. Do not take AM of surgery    irbesartan (AVAPRO) 150 mg tablet Take 1 Tab by mouth nightly.  metFORMIN (GLUCOPHAGE) 1,000 mg tablet TAKE 1 TABLET TWICE A DAY WITH MEALS    atenolol (TENORMIN) 50 mg tablet TAKE 1 TABLET DAILY    omeprazole (PRILOSEC) 40 mg capsule TAKE 1 CAPSULE DAILY    atorvastatin (LIPITOR) 40 mg tablet TAKE 1 TABLET DAILY (START LIPITOR AFTER VYTORIN IS FINISHED)     No current facility-administered medications for this visit.         Medications Discontinued During This Encounter   Medication Reason    Calcium-Cholecalciferol, D3, (CALCIUM 500 + D, D3,) 500 mg(1,250mg) -067 unit tab Duplicate Order       [facility] follow up appointment(s):   Future Appointments   Date Time Provider Rafa Santoyo   9/26/2019  9:45 AM DO NAKUL Stephenson   2/13/2020  2:20 PM Leonardo Maynard  [address] [facility] follow up appointment(s): urology, ortho  [facility]:  n/a       Goals      Attends follow-up appointments as directed. 9/23 - Patient will attend ortho, urology, and PCP follow-up appointments. OSITO       Completes ACP      9/23/19 - No ACP on file. Will discuss at next call. OSITO       Prevent complications post hospitalization. 9/23/19 - Patient will complete Levaquin. Dr. Lizzy Strong will repeat CBC to check H/H. Will monitor for results.  OSITO Dressing: bandage

## 2023-11-21 ENCOUNTER — APPOINTMENT (OUTPATIENT)
Dept: URBAN - METROPOLITAN AREA CLINIC 246 | Age: 73
Setting detail: DERMATOLOGY
End: 2023-11-22

## 2023-11-21 DIAGNOSIS — Z48.02 ENCOUNTER FOR REMOVAL OF SUTURES: ICD-10-CM

## 2023-11-21 PROCEDURE — OTHER SUTURE REMOVAL (GLOBAL PERIOD): OTHER

## 2023-11-21 PROCEDURE — OTHER COUNSELING: OTHER

## 2023-11-21 ASSESSMENT — LOCATION ZONE DERM: LOCATION ZONE: TRUNK

## 2023-11-21 ASSESSMENT — LOCATION DETAILED DESCRIPTION DERM: LOCATION DETAILED: RIGHT INFERIOR MEDIAL MIDBACK

## 2023-11-21 ASSESSMENT — LOCATION SIMPLE DESCRIPTION DERM: LOCATION SIMPLE: RIGHT LOWER BACK

## 2023-11-21 NOTE — PROCEDURE: SUTURE REMOVAL (GLOBAL PERIOD)
Detail Level: Detailed
Add 18420 Cpt? (Important Note: In 2017 The Use Of 22603 Is Being Tracked By Cms To Determine Future Global Period Reimbursement For Global Periods): no

## 2024-10-18 ENCOUNTER — APPOINTMENT (OUTPATIENT)
Dept: URBAN - METROPOLITAN AREA CLINIC 246 | Age: 74
Setting detail: DERMATOLOGY
End: 2024-10-18

## 2024-10-18 DIAGNOSIS — L82.0 INFLAMED SEBORRHEIC KERATOSIS: ICD-10-CM

## 2024-10-18 DIAGNOSIS — F42.4 EXCORIATION (SKIN-PICKING) DISORDER: ICD-10-CM

## 2024-10-18 DIAGNOSIS — Z71.89 OTHER SPECIFIED COUNSELING: ICD-10-CM

## 2024-10-18 DIAGNOSIS — Z12.83 ENCOUNTER FOR SCREENING FOR MALIGNANT NEOPLASM OF SKIN: ICD-10-CM

## 2024-10-18 DIAGNOSIS — D18.0 HEMANGIOMA: ICD-10-CM

## 2024-10-18 DIAGNOSIS — L57.8 OTHER SKIN CHANGES DUE TO CHRONIC EXPOSURE TO NONIONIZING RADIATION: ICD-10-CM

## 2024-10-18 DIAGNOSIS — L82.1 OTHER SEBORRHEIC KERATOSIS: ICD-10-CM

## 2024-10-18 PROBLEM — D18.01 HEMANGIOMA OF SKIN AND SUBCUTANEOUS TISSUE: Status: ACTIVE | Noted: 2024-10-18

## 2024-10-18 PROCEDURE — OTHER COUNSELING: OTHER

## 2024-10-18 PROCEDURE — OTHER LIQUID NITROGEN: OTHER

## 2024-10-18 PROCEDURE — 99213 OFFICE O/P EST LOW 20 MIN: CPT | Mod: 25

## 2024-10-18 PROCEDURE — OTHER ADDITIONAL NOTES: OTHER

## 2024-10-18 PROCEDURE — OTHER MIPS QUALITY: OTHER

## 2024-10-18 PROCEDURE — 17110 DESTRUCT B9 LESION 1-14: CPT

## 2024-10-18 ASSESSMENT — LOCATION SIMPLE DESCRIPTION DERM
LOCATION SIMPLE: RIGHT FOREARM
LOCATION SIMPLE: RIGHT PRETIBIAL REGION

## 2024-10-18 ASSESSMENT — LOCATION ZONE DERM
LOCATION ZONE: LEG
LOCATION ZONE: ARM

## 2024-10-18 ASSESSMENT — LOCATION DETAILED DESCRIPTION DERM
LOCATION DETAILED: RIGHT DISTAL DORSAL FOREARM
LOCATION DETAILED: RIGHT LATERAL DISTAL PRETIBIAL REGION

## 2024-10-18 NOTE — PROCEDURE: ADDITIONAL NOTES
Render Risk Assessment In Note?: no
Detail Level: Simple
Additional Notes: Patient admits to picking. Encouraged patient to avoid picking and to keep area moist with Vaseline. If lesion worsens in any way, contact the office to re-evaluation.

## 2024-10-18 NOTE — PROCEDURE: LIQUID NITROGEN
Number Of Freeze-Thaw Cycles: 2 freeze-thaw cycles
Duration Of Freeze Thaw-Cycle (Seconds): 5-10
Show Spray Paint Technique Variable?: Yes
Post-Care Instructions: I reviewed with the patient in detail post-care instructions. Patient is to wear sunprotection, and avoid picking at any of the treated lesions. Pt may apply Vaseline to crusted or scabbing areas.
Detail Level: Detailed
Consent: The patient's consent was obtained including but not limited to risks of crusting, scabbing, blistering, scarring, darker or lighter pigmentary change, recurrence, incomplete removal and infection.
Add 52 Modifier (Optional): no
Medical Necessity Clause: This procedure was medically necessary because the lesions that were treated were:
Pared With?: 15 blade
Medical Necessity Information: It is in your best interest to select a reason for this procedure from the list below. All of these items fulfill various CMS LCD requirements except the new and changing color options.
Spray Paint Text: The liquid nitrogen was applied to the skin utilizing a spray paint frosting technique.

## 2025-03-28 NOTE — PROGRESS NOTES
HPI:    Patient ID: Alfredo Lancaster is a 75year old male. Kidney Problem   Pertinent negatives include no abdominal pain, chest pain, chills or fever. Bladder stone  Chronic problem.  01/02/2018 KUB = Innumerable (more than 20) bladder calculi vis PDMP assessed, dose appropriate, prescription sent.     Chronic problems. Patient is followed for hyperuricosuria and hypercalcuria. He reports he is following the recommended diet that was given to him. He feels problem is stable.  He is currently taking chlorthalidone and potassium citrate with moderate impr CHART REVIEW: Please see above. In addition to above. .. Known large BPH for a long time. Cystoscopy with [facility] of large 4 cm bladder stone [facility] August 19, 2004. cleared of all bladder stones, but then they returned on plain x-ray.  cystoscopy with laser lithot Restarted alfuzosin 10 mg daily.  07/05/2017 office visit with me; Prostate: 3+ enlarged, 40 g, no palpable nodules or indurations; Start chlorthalidone 25 mg daily; Start potassium citrate 1080 mg tablet, 2 tablets twice daily with food; Continue finasteri Comment: quit  Alcohol use: No               Comment: social              Current Outpatient Prescriptions:  insulin glargine 100 UNIT/ML Subcutaneous Solution Inject 15 Units into the skin one time.  Disp:  Rfl:    Multiple Vitamins-Minerals (Lei Agnieszka ibuprofen 600 MG Oral Tab Take 1 tablet (600 mg total) by mouth every 6 (six) hours as needed. Take with food. Stop if stomach upset.  Disp: 40 tablet Rfl: 0     Allergies:No Known Allergies   PHYSICAL EXAM:   Physical Exam   Constitutional: He appears well 2/27/16 PSA 1.7 x2 for finasteride effect: 3.4   2/23/16 Creatinine 1.363, eGFR 52       IMAGING  01/05/2018 US Kidney/Bladder = Negative for hydronephrosis. Multiple bladder calculi seen.     01/02/2018 KUB = calcifications -- intrarenal vascular calcific (N21.0) Bladder stone  (primary encounter diagnosis)  01/02/2018 KUB = Innumerable (more than 20) bladder calculi visualized within the bladder measure up to 14 mm in diameter similar to prior abdominal CT.  D I review  01/02/2018 KUB   and explained to shae (R82.99) Hyperuricosuria  35/65/9040 metabolic 29-HJ urine uric acid urine = 891, elevated. I explained to the patient this is likely caused by high intake of red meat.  I explained treatment option of limiting red meat consumption and pt chooses to follow 7.  Because of increasing size and bladder stone, which is now very large, patient agrees to cystoscopy with laser lithotripsy of bladder stone under general anesthesia.     8. To take usual daily alfuzosin 10 mg evening before the procedure    9.    no asp

## 2025-05-28 NOTE — PROCEDURE: MIPS QUALITY
Quality 47: Advance Care Plan: Advance Care Planning discussed and documented; advance care plan or surrogate decision maker documented in the medical record.
Quality 226: Preventive Care And Screening: Tobacco Use: Screening And Cessation Intervention: Patient screened for tobacco use and is an ex/non-smoker
Quality 111:Pneumonia Vaccination Status For Older Adults: Patient received any pneumococcal conjugate or polysaccharide vaccine on or after their 60th birthday and before the end of the measurement period
Detail Level: Detailed
same name as above

## (undated) DEVICE — URINE DRAINAGE BAG,NEEDLE SAMPLING, ANTI-REFLUX DEVICE, DRAIN TUBE: Brand: DOVER

## (undated) DEVICE — VIOLET BRAIDED (POLYGLACTIN 910), SYNTHETIC ABSORBABLE SUTURE: Brand: COATED VICRYL

## (undated) DEVICE — PROXIMATE SKIN STAPLERS (35 WIDE) CONTAINS 35 STAINLESS STEEL STAPLES (FIXED HEAD): Brand: PROXIMATE

## (undated) DEVICE — GLOVE SRG BIOGEL 8.0

## (undated) DEVICE — CATH SECURING DEVICE STATLOCK

## (undated) DEVICE — Device

## (undated) DEVICE — SOL H2O 3000ML IRRIG

## (undated) DEVICE — CYSTO PACK: Brand: MEDLINE INDUSTRIES, INC.

## (undated) DEVICE — SOL  .9 1000ML BTL

## (undated) DEVICE — INTENDED FOR TISSUE SEPARATION, AND OTHER PROCEDURES THAT REQUIRE A SHARP SURGICAL BLADE TO PUNCTURE OR CUT.: Brand: BARD-PARKER ® STAINLESS STEEL BLADES

## (undated) DEVICE — TOWEL OR BLU 16X26 STRL

## (undated) DEVICE — STERILE LATEX POWDER-FREE SURGICAL GLOVES WITH HYDROGEL COATING, SMOOTH FINISH, STRAIGHT FINGER: Brand: PROTEXIS

## (undated) DEVICE — ZIMMER® STERILE DISPOSABLE TOURNIQUET CUFF WITH PLC, DUAL PORT, SINGLE BLADDER, 30 IN. (76 CM)

## (undated) DEVICE — BATTERY

## (undated) DEVICE — UROLOGY DRAIN BAG

## (undated) DEVICE — COTTON UNDERCAST PADDING,REGULAR FINISH: Brand: WEBRIL

## (undated) DEVICE — STERILE SURGICAL LUBRICANT, METAL TUBE: Brand: SURGILUBE

## (undated) DEVICE — STERILE LATEX POWDER-FREE SURGICAL GLOVESWITH NITRILE COATING: Brand: PROTEXIS

## (undated) DEVICE — COVER SGL STRL LGHT HNDL BLU

## (undated) DEVICE — SOL  .9 3000ML

## (undated) DEVICE — SOL H2O 1000ML BTL

## (undated) DEVICE — EVAC URL LDSEN DF4 IBIR 64CM

## (undated) DEVICE — SPLINT PRECUT SYNTH 5X30

## (undated) DEVICE — CATH URTH LBRCTH 22FR LNG

## (undated) DEVICE — EYE PADSSTERILENOT MADE WITH NATURAL RUBBER LATEXSINGLE USE ONLYDO NOT USE IF PACKAGE OPENED OR DAMAGED: Brand: CARDINAL HEALTH

## (undated) DEVICE — C-ARMOR C-ARM EQUIPMENT COVERS CLEAR STERILE UNIVERSAL FIT 12 PER CASE: Brand: C-ARMOR

## (undated) DEVICE — CHLORAPREP 26ML APPLICATOR

## (undated) DEVICE — GOWN SURG AERO BLUE PERF LG

## (undated) DEVICE — STERILE POLYISOPRENE POWDER-FREE SURGICAL GLOVES: Brand: PROTEXIS

## (undated) DEVICE — GAUZE SPONGES,12 PLY: Brand: CURITY

## (undated) DEVICE — PLASTC TOOMEY SYRNG DISP

## (undated) DEVICE — ZIMMER® STERILE DISPOSABLE TOURNIQUET CUFF WITH PLC, DUAL PORT, SINGLE BLADDER, 34 IN. (86 CM)

## (undated) DEVICE — SPLINT PRECUT SYNTH 4X30

## (undated) DEVICE — CLIPPER BLADE 3M

## (undated) DEVICE — MEDI-VAC NON-CONDUCTIVE SUCTION TUBING: Brand: CARDINAL HEALTH

## (undated) DEVICE — DRAPE C-ARM UNIVERSAL

## (undated) DEVICE — REM POLYHESIVE ADULT PATIENT RETURN ELECTRODE: Brand: VALLEYLAB

## (undated) DEVICE — DRILL BIT SYNT 2.5X110 310.25

## (undated) DEVICE — LOWER EXTREMITY: Brand: MEDLINE INDUSTRIES, INC.

## (undated) NOTE — LETTER
No referring provider defined for this encounter.        04/28/18        Patient: Mik Tompkins   YOB: 1950   Date of Visit: 4/26/2018       Dear  Dr. Ezekiel Lopes MD,      I evaluated  Kelsey Gupta in the office today, and he has d (N40.1,  R35.0) Benign prostatic hyperplasia with urinary frequency  On COLTEN, prostate 2+ enlarged, no palpable nodules or indurations.  I reviewed treatment options with pt and I answered pt's questions on treatment; pt understands and chooses to continue f Pt with a known large bladder stone.   I reviewed with patient that during plan cystoscopy with laser lithotripsy of bladder stone, bladder will be inspected as well to make sure no bladder tumors--he understands.    (E55.9) Low vitamin D level  Please see